# Patient Record
Sex: MALE | Race: WHITE | NOT HISPANIC OR LATINO | Employment: OTHER | URBAN - NONMETROPOLITAN AREA
[De-identification: names, ages, dates, MRNs, and addresses within clinical notes are randomized per-mention and may not be internally consistent; named-entity substitution may affect disease eponyms.]

---

## 2017-01-19 ENCOUNTER — TELEPHONE (OUTPATIENT)
Dept: CARDIOLOGY | Facility: CLINIC | Age: 52
End: 2017-01-19

## 2017-01-19 RX ORDER — INSULIN GLARGINE 100 [IU]/ML
45 INJECTION, SOLUTION SUBCUTANEOUS DAILY
Qty: 30 ML | Refills: 2 | Status: SHIPPED | OUTPATIENT
Start: 2017-01-19 | End: 2017-05-18 | Stop reason: ALTCHOICE

## 2017-01-19 NOTE — TELEPHONE ENCOUNTER
----- Message from Deisi Martínez sent at 1/19/2017 11:17 AM EST -----   INSULIN PIN NEEDLES LANTUS AND HUMALOG    [facility]

## 2017-02-10 RX ORDER — LISINOPRIL 20 MG/1
20 TABLET ORAL DAILY
Qty: 180 TABLET | Refills: 0 | Status: SHIPPED | OUTPATIENT
Start: 2017-02-10 | End: 2017-02-13

## 2017-02-10 RX ORDER — GABAPENTIN 400 MG/1
400 CAPSULE ORAL NIGHTLY
Qty: 30 CAPSULE | Refills: 2 | Status: SHIPPED | OUTPATIENT
Start: 2017-02-10 | End: 2017-05-08 | Stop reason: SDUPTHER

## 2017-02-13 RX ORDER — LISINOPRIL 20 MG/1
20 TABLET ORAL 2 TIMES DAILY
Qty: 180 TABLET | Refills: 0 | Status: SHIPPED | OUTPATIENT
Start: 2017-02-13 | End: 2017-03-07 | Stop reason: SDUPTHER

## 2017-02-13 NOTE — TELEPHONE ENCOUNTER
Pt should be taking lisinopril 20 bid. Refill was sent in for qd. rx corrected and resent to josette Perdomo

## 2017-03-02 ENCOUNTER — OFFICE VISIT (OUTPATIENT)
Dept: CARDIOLOGY | Facility: CLINIC | Age: 52
End: 2017-03-02

## 2017-03-02 VITALS
WEIGHT: 206 LBS | SYSTOLIC BLOOD PRESSURE: 138 MMHG | OXYGEN SATURATION: 97 % | HEART RATE: 69 BPM | DIASTOLIC BLOOD PRESSURE: 78 MMHG | HEIGHT: 68 IN | BODY MASS INDEX: 31.22 KG/M2

## 2017-03-02 DIAGNOSIS — Z79.4 TYPE 2 DIABETES MELLITUS WITH DIABETIC POLYNEUROPATHY, WITH LONG-TERM CURRENT USE OF INSULIN (HCC): ICD-10-CM

## 2017-03-02 DIAGNOSIS — N40.0 BENIGN NON-NODULAR PROSTATIC HYPERPLASIA WITHOUT LOWER URINARY TRACT SYMPTOMS: ICD-10-CM

## 2017-03-02 DIAGNOSIS — I10 ESSENTIAL HYPERTENSION: ICD-10-CM

## 2017-03-02 DIAGNOSIS — E11.42 TYPE 2 DIABETES MELLITUS WITH DIABETIC POLYNEUROPATHY, WITH LONG-TERM CURRENT USE OF INSULIN (HCC): ICD-10-CM

## 2017-03-02 DIAGNOSIS — E66.9 NON MORBID OBESITY, UNSPECIFIED OBESITY TYPE: ICD-10-CM

## 2017-03-02 DIAGNOSIS — Z72.0 TOBACCO USE: ICD-10-CM

## 2017-03-02 DIAGNOSIS — G62.9 PERIPHERAL POLYNEUROPATHY: ICD-10-CM

## 2017-03-02 DIAGNOSIS — E78.1 HYPERLIPIDEMIA TYPE IV: Primary | ICD-10-CM

## 2017-03-02 DIAGNOSIS — R53.82 CHRONIC FATIGUE: ICD-10-CM

## 2017-03-02 PROCEDURE — 99214 OFFICE O/P EST MOD 30 MIN: CPT | Performed by: INTERNAL MEDICINE

## 2017-03-02 NOTE — PROGRESS NOTES
subjective     Chief Complaint   Patient presents with   • Hypertension   • Hyperlipidemia   • Obesity     History of Present Illness  Diabetes Mellitus   Gui Li has type 2 diabetes mellitus. Taking oral hypoglycemic agents. Tolerating medications very well. he sugar is fluctuating. he is trying to diet and lose weight and exercise. There are no diabetic complications.  Patient brought in readings of blood sugar monitors. Blood sugar monitor readings are all normal.     HYPERTENSION  Gui Li has long-standing essential hypertension. he is taking medications regularly. There are no medication side effects. Blood pressure is running high. There has been no headache nausea chest pain. There has been no syncopal or presyncopal episode.  Patient is asymptomatic.     Hyperlipidemia  Gui Li has long-standing history of hyperlipidemia. Has been trying to lose weight and trying to follow diet and activity recommandations. Patient is tolerating medications very well. There has been no side effects. Latest lipid levels have been fluctuating. Cholesterol is normal but triglyceride is high.     Obesity  Patient has not been able to lose any weight.     He has peripheral neuropathy and states that Neurontin 100 mg twice a day is not helping. Although he takes 400 bedtime also.       Patient Active Problem List   Diagnosis   • Leg cramps*   • Peripheral neuritis*   • Obesity*   • Tobacco use*   • Hyperlipidemia type IV*   • Hypertension*   • Type 2 diabetes mellitus with diabetic polyneuropathy*   • Chronic fatigue       Social History   Substance Use Topics   • Smoking status: Current Every Day Smoker     Packs/day: 1.50     Years: 35.00     Types: Cigarettes   • Smokeless tobacco: Never Used   • Alcohol use No       No Known Allergies      Current Outpatient Prescriptions:   •  amLODIPine (NORVASC) 5 MG tablet, Take 1 tablet by mouth Daily., Disp: 90 tablet, Rfl: 3  •  diphenhydrAMINE-acetaminophen  "(TYLENOL PM)  MG tablet per tablet, Take 1 tablet by mouth at night as needed for sleep., Disp: , Rfl:   •  gabapentin (NEURONTIN) 300 MG capsule, Take 1 capsule by mouth 2 (Two) Times a Day., Disp: 60 capsule, Rfl: 3  •  gabapentin (NEURONTIN) 400 MG capsule, Take 1 capsule by mouth Every Night., Disp: 30 capsule, Rfl: 2  •  glucose blood (FREESTYLE LITE) test strip, 1 each by Other route 4 (Four) Times a Day., Disp: 100 each, Rfl: 3  •  hydrALAZINE (APRESOLINE) 10 MG tablet, Take 1 tablet by mouth 2 (two) times a day., Disp: 180 tablet, Rfl: 1  •  insulin glargine (LANTUS) 100 UNIT/ML injection, Inject 45 Units under the skin Daily., Disp: 30 mL, Rfl: 2  •  Insulin Lispro (HUMALOG KWIKPEN) 100 UNIT/ML solution pen-injector, Inject 10 Units under the skin 3 (Three) Times a Day After Meals., Disp: 3 mL, Rfl: 6  •  Insulin Pen Needle (EASY TOUCH PEN NEEDLES) 32G X 6 MM misc, Inject 1 Units under the skin 4 (Four) Times a Day., Disp: 100 each, Rfl: 3  •  Lancets (FREESTYLE) lancets, 1 each by Other route 4 (four) times a day., Disp: 100 each, Rfl: 3  •  lisinopril (PRINIVIL,ZESTRIL) 20 MG tablet, Take 1 tablet by mouth 2 (Two) Times a Day., Disp: 180 tablet, Rfl: 0  •  Q-DRYL 25 MG, TAKE 1 CAPSULE BY MOUTH EVERY NIGHT AT BEDTIME AS NEEDED FOR ITCHING, Disp: 30 capsule, Rfl: 0      The following portions of the patient's history were reviewed and updated as appropriate: allergies, current medications, past family history, past medical history, past social history, past surgical history and problem list.    Review of Systems   Constitution: Positive for weakness.   HENT: Negative.    Eyes: Negative.    Cardiovascular: Negative.    Respiratory: Negative.    Hematologic/Lymphatic: Negative.    Musculoskeletal: Positive for muscle cramps.   Gastrointestinal: Negative.           Objective:     Visit Vitals   • /78 (BP Location: Left arm, Patient Position: Sitting)   • Pulse 69   • Ht 68\" (172.7 cm)   • Wt 206 lb " (93.4 kg)   • SpO2 97%   • BMI 31.32 kg/m2     Physical Exam   Constitutional: He appears well-developed and well-nourished.   HENT:   Head: Normocephalic and atraumatic.   Mouth/Throat: Oropharynx is clear and moist.   Eyes: Conjunctivae and EOM are normal. Pupils are equal, round, and reactive to light. No scleral icterus.   Neck: Normal range of motion. Neck supple. No JVD present. No tracheal deviation present. No thyromegaly present.   Cardiovascular: Normal rate, regular rhythm, normal heart sounds and intact distal pulses.  Exam reveals no friction rub.    No murmur heard.  Pulmonary/Chest: Effort normal and breath sounds normal. No respiratory distress. He has no wheezes. He has no rales. He exhibits no tenderness.   Abdominal: Soft. Bowel sounds are normal. He exhibits no distension and no mass. There is no tenderness. There is no rebound and no guarding.   Musculoskeletal: Normal range of motion. He exhibits no edema, tenderness or deformity.   Lymphadenopathy:     He has no cervical adenopathy.   Neurological: He is alert. He has normal reflexes. No cranial nerve deficit. He exhibits normal muscle tone. Coordination normal.   Skin: Skin is warm and dry.   Psychiatric: He has a normal mood and affect. His behavior is normal. Judgment and thought content normal.         Lab Review  Lab Results   Component Value Date     12/06/2016    K 4.6 12/06/2016     (H) 12/06/2016    BUN 24 (H) 12/06/2016    CREATININE 1.24 12/06/2016    GLUCOSE 141 (H) 12/06/2016    CALCIUM 9.6 12/06/2016    ALT 29 12/06/2016    ALKPHOS 74 12/06/2016    LABIL2 1.5 12/06/2016     Lab Results   Component Value Date    CKTOTAL 254 (H) 12/06/2016     Lab Results   Component Value Date    WBC 9.88 12/06/2016    HGB 15.9 12/06/2016    HCT 47.8 12/06/2016     12/06/2016     No results found for: INR  No results found for: MG  Lab Results   Component Value Date    PSA 0.27 01/12/2015    TSH 2.812 12/06/2016     No results  found for: BNP  Lab Results   Component Value Date    CHLPL 128 01/29/2016     Lab Results   Component Value Date    CHOL 175 12/06/2016    TRIG 273 (H) 12/06/2016    HDL 32 (L) 12/06/2016    LDLCALC 88 12/06/2016    VLDL 54.6 12/06/2016    LDLHDL 2.76 12/06/2016         Procedures     I personally viewed and interpreted the patient's LAB data         Assessment:     1. Hyperlipidemia type IV*    2. Essential hypertension    3. Non morbid obesity, unspecified obesity type    4. Peripheral polyneuropathy    5. Tobacco use*    6. Type 2 diabetes mellitus with diabetic polyneuropathy, with long-term current use of insulin    7. Chronic fatigue          Plan:      Patient has type IV hyperlipidemia.  Cholesterol has been normal but triglyceride is elevated.  Healthy lifestyle was discussed.  He will continue current medications lab work was scheduled for next visit.    Physical discussed with the patient that his triglycerides are probably due to his sugar he will need to have a better sugar control dietary restrictions were again explained and more walking and exercise program discussed.    Blood pressure is very well controlled patient brought the daily blood pressure readings.  He checks it 3 times a day.  Blood pressure appears to be very well controlled.    Unfortunately patient is still using tobacco  Cessation of tobacco use was again stressed.  Refills were given  Lab work scheduled  Aggressive risk factor modification discussed.          No Follow-up on file.

## 2017-03-07 RX ORDER — LISINOPRIL 20 MG/1
20 TABLET ORAL 2 TIMES DAILY
Qty: 180 TABLET | Refills: 0 | Status: SHIPPED | OUTPATIENT
Start: 2017-03-07 | End: 2017-07-08 | Stop reason: SDUPTHER

## 2017-03-10 RX ORDER — HYDRALAZINE HYDROCHLORIDE 10 MG/1
TABLET, FILM COATED ORAL
Qty: 180 TABLET | Refills: 0 | Status: SHIPPED | OUTPATIENT
Start: 2017-03-10 | End: 2017-06-08 | Stop reason: SDUPTHER

## 2017-04-07 ENCOUNTER — TELEPHONE (OUTPATIENT)
Dept: CARDIOLOGY | Facility: CLINIC | Age: 52
End: 2017-04-07

## 2017-04-07 NOTE — TELEPHONE ENCOUNTER
----- Message from Hortensia Hernandez sent at 4/7/2017  9:58 AM EDT -----  Regarding: REFILL   CHYNA  GABAPENTIN 300 MG TID

## 2017-04-08 RX ORDER — GABAPENTIN 300 MG/1
CAPSULE ORAL
Qty: 60 CAPSULE | Refills: 0 | Status: CANCELLED | OUTPATIENT
Start: 2017-04-08

## 2017-04-10 RX ORDER — GABAPENTIN 300 MG/1
300 CAPSULE ORAL 2 TIMES DAILY
Qty: 60 CAPSULE | Refills: 2 | Status: SHIPPED | OUTPATIENT
Start: 2017-04-10 | End: 2017-07-08 | Stop reason: SDUPTHER

## 2017-05-08 ENCOUNTER — TELEPHONE (OUTPATIENT)
Dept: CARDIOLOGY | Facility: CLINIC | Age: 52
End: 2017-05-08

## 2017-05-08 RX ORDER — GABAPENTIN 400 MG/1
400 CAPSULE ORAL NIGHTLY
Qty: 30 CAPSULE | Refills: 2 | Status: SHIPPED | OUTPATIENT
Start: 2017-05-08 | End: 2017-06-08 | Stop reason: SDUPTHER

## 2017-05-08 RX ORDER — DIPHENHYDRAMINE HCL 25 MG
25 CAPSULE ORAL NIGHTLY PRN
Qty: 30 CAPSULE | Refills: 0 | Status: SHIPPED | OUTPATIENT
Start: 2017-05-08 | End: 2017-08-10 | Stop reason: ALTCHOICE

## 2017-05-17 ENCOUNTER — LAB (OUTPATIENT)
Dept: CARDIOLOGY | Facility: CLINIC | Age: 52
End: 2017-05-17

## 2017-05-17 ENCOUNTER — TELEPHONE (OUTPATIENT)
Dept: CARDIOLOGY | Facility: CLINIC | Age: 52
End: 2017-05-17

## 2017-05-17 DIAGNOSIS — E11.42 TYPE 2 DIABETES MELLITUS WITH DIABETIC POLYNEUROPATHY, WITH LONG-TERM CURRENT USE OF INSULIN (HCC): ICD-10-CM

## 2017-05-17 DIAGNOSIS — N40.0 BENIGN NON-NODULAR PROSTATIC HYPERPLASIA WITHOUT LOWER URINARY TRACT SYMPTOMS: ICD-10-CM

## 2017-05-17 DIAGNOSIS — Z79.4 TYPE 2 DIABETES MELLITUS WITH DIABETIC POLYNEUROPATHY, WITH LONG-TERM CURRENT USE OF INSULIN (HCC): ICD-10-CM

## 2017-05-17 DIAGNOSIS — E78.1 HYPERLIPIDEMIA TYPE IV: ICD-10-CM

## 2017-05-17 LAB
ALBUMIN SERPL-MCNC: 4 G/DL (ref 3.5–5)
ALBUMIN/GLOB SERPL: 1.4 G/DL (ref 1.5–2.5)
ALP SERPL-CCNC: 77 U/L (ref 40–129)
ALT SERPL W P-5'-P-CCNC: 32 U/L (ref 10–44)
ANION GAP SERPL CALCULATED.3IONS-SCNC: 3.5 MMOL/L (ref 3.6–11.2)
AST SERPL-CCNC: 30 U/L (ref 10–34)
BASOPHILS # BLD AUTO: 0.02 10*3/MM3 (ref 0–0.3)
BASOPHILS NFR BLD AUTO: 0.2 % (ref 0–2)
BILIRUB SERPL-MCNC: 0.5 MG/DL (ref 0.2–1.8)
BUN BLD-MCNC: 20 MG/DL (ref 7–21)
BUN/CREAT SERPL: 15.5 (ref 7–25)
CALCIUM SPEC-SCNC: 9 MG/DL (ref 7.7–10)
CHLORIDE SERPL-SCNC: 109 MMOL/L (ref 99–112)
CHOLEST SERPL-MCNC: 166 MG/DL (ref 0–200)
CK SERPL-CCNC: 669 U/L (ref 24–204)
CO2 SERPL-SCNC: 28.5 MMOL/L (ref 24.3–31.9)
CREAT BLD-MCNC: 1.29 MG/DL (ref 0.43–1.29)
DEPRECATED RDW RBC AUTO: 44.8 FL (ref 37–54)
EOSINOPHIL # BLD AUTO: 0.27 10*3/MM3 (ref 0–0.7)
EOSINOPHIL NFR BLD AUTO: 2.6 % (ref 0–5)
ERYTHROCYTE [DISTWIDTH] IN BLOOD BY AUTOMATED COUNT: 13.6 % (ref 11.5–14.5)
GFR SERPL CREATININE-BSD FRML MDRD: 58 ML/MIN/1.73
GLOBULIN UR ELPH-MCNC: 2.9 GM/DL
GLUCOSE BLD-MCNC: 141 MG/DL (ref 70–110)
HBA1C MFR BLD: 7.1 % (ref 4.5–5.7)
HCT VFR BLD AUTO: 47.3 % (ref 42–52)
HDLC SERPL-MCNC: 31 MG/DL (ref 60–100)
HGB BLD-MCNC: 15.3 G/DL (ref 14–18)
IMM GRANULOCYTES # BLD: 0.06 10*3/MM3 (ref 0–0.03)
IMM GRANULOCYTES NFR BLD: 0.6 % (ref 0–0.5)
LDLC SERPL CALC-MCNC: 86 MG/DL (ref 0–100)
LDLC/HDLC SERPL: 2.77 {RATIO}
LYMPHOCYTES # BLD AUTO: 2.35 10*3/MM3 (ref 1–3)
LYMPHOCYTES NFR BLD AUTO: 22.4 % (ref 21–51)
MCH RBC QN AUTO: 30.5 PG (ref 27–33)
MCHC RBC AUTO-ENTMCNC: 32.3 G/DL (ref 33–37)
MCV RBC AUTO: 94.2 FL (ref 80–94)
MONOCYTES # BLD AUTO: 0.89 10*3/MM3 (ref 0.1–0.9)
MONOCYTES NFR BLD AUTO: 8.5 % (ref 0–10)
NEUTROPHILS # BLD AUTO: 6.91 10*3/MM3 (ref 1.4–6.5)
NEUTROPHILS NFR BLD AUTO: 65.7 % (ref 30–70)
OSMOLALITY SERPL CALC.SUM OF ELEC: 286.2 MOSM/KG (ref 273–305)
PLATELET # BLD AUTO: 247 10*3/MM3 (ref 130–400)
PMV BLD AUTO: 12.1 FL (ref 6–10)
POTASSIUM BLD-SCNC: 4.7 MMOL/L (ref 3.5–5.3)
PROT SERPL-MCNC: 6.9 G/DL (ref 6–8)
PSA SERPL-MCNC: 0.34 NG/ML (ref 0–4)
RBC # BLD AUTO: 5.02 10*6/MM3 (ref 4.7–6.1)
SODIUM BLD-SCNC: 141 MMOL/L (ref 135–153)
TRIGL SERPL-MCNC: 245 MG/DL (ref 0–150)
VLDLC SERPL-MCNC: 49 MG/DL
WBC NRBC COR # BLD: 10.5 10*3/MM3 (ref 4.5–12.5)

## 2017-05-17 PROCEDURE — 80053 COMPREHEN METABOLIC PANEL: CPT | Performed by: INTERNAL MEDICINE

## 2017-05-17 PROCEDURE — 83036 HEMOGLOBIN GLYCOSYLATED A1C: CPT | Performed by: INTERNAL MEDICINE

## 2017-05-17 PROCEDURE — 80061 LIPID PANEL: CPT | Performed by: INTERNAL MEDICINE

## 2017-05-17 PROCEDURE — 82550 ASSAY OF CK (CPK): CPT | Performed by: INTERNAL MEDICINE

## 2017-05-17 PROCEDURE — 85025 COMPLETE CBC W/AUTO DIFF WBC: CPT | Performed by: INTERNAL MEDICINE

## 2017-05-17 PROCEDURE — 84153 ASSAY OF PSA TOTAL: CPT | Performed by: INTERNAL MEDICINE

## 2017-05-18 ENCOUNTER — OFFICE VISIT (OUTPATIENT)
Dept: CARDIOLOGY | Facility: CLINIC | Age: 52
End: 2017-05-18

## 2017-05-18 VITALS
DIASTOLIC BLOOD PRESSURE: 75 MMHG | SYSTOLIC BLOOD PRESSURE: 140 MMHG | HEIGHT: 68 IN | BODY MASS INDEX: 30.46 KG/M2 | HEART RATE: 92 BPM | OXYGEN SATURATION: 96 % | WEIGHT: 201 LBS

## 2017-05-18 DIAGNOSIS — E11.42 TYPE 2 DIABETES MELLITUS WITH DIABETIC POLYNEUROPATHY, WITH LONG-TERM CURRENT USE OF INSULIN (HCC): ICD-10-CM

## 2017-05-18 DIAGNOSIS — I10 ESSENTIAL HYPERTENSION: ICD-10-CM

## 2017-05-18 DIAGNOSIS — R74.8 ELEVATED CPK: ICD-10-CM

## 2017-05-18 DIAGNOSIS — E78.1 HYPERLIPIDEMIA TYPE IV: Primary | ICD-10-CM

## 2017-05-18 DIAGNOSIS — Z72.0 TOBACCO USE: ICD-10-CM

## 2017-05-18 DIAGNOSIS — R53.82 CHRONIC FATIGUE: ICD-10-CM

## 2017-05-18 DIAGNOSIS — Z79.4 TYPE 2 DIABETES MELLITUS WITH DIABETIC POLYNEUROPATHY, WITH LONG-TERM CURRENT USE OF INSULIN (HCC): ICD-10-CM

## 2017-05-18 DIAGNOSIS — E66.9 NON MORBID OBESITY, UNSPECIFIED OBESITY TYPE: ICD-10-CM

## 2017-05-18 DIAGNOSIS — R25.2 CRAMP OF BOTH LOWER EXTREMITIES: ICD-10-CM

## 2017-05-18 PROCEDURE — 99214 OFFICE O/P EST MOD 30 MIN: CPT | Performed by: INTERNAL MEDICINE

## 2017-05-18 RX ORDER — INSULIN GLARGINE 100 [IU]/ML
INJECTION, SOLUTION SUBCUTANEOUS NIGHTLY
COMMUNITY
End: 2017-06-07 | Stop reason: SDUPTHER

## 2017-06-06 RX ORDER — LANCETS
EACH MISCELLANEOUS
Qty: 100 EACH | Refills: 12 | Status: SHIPPED | OUTPATIENT
Start: 2017-06-06

## 2017-06-06 RX ORDER — BLOOD-GLUCOSE METER
1 EACH MISCELLANEOUS 3 TIMES DAILY
Qty: 1 EACH | Refills: 0 | Status: SHIPPED | OUTPATIENT
Start: 2017-06-06

## 2017-06-07 RX ORDER — INSULIN GLARGINE 100 [IU]/ML
INJECTION, SOLUTION SUBCUTANEOUS
Qty: 30 PEN | Refills: 0 | Status: SHIPPED | OUTPATIENT
Start: 2017-06-07 | End: 2017-08-07 | Stop reason: SDUPTHER

## 2017-06-08 RX ORDER — HYDRALAZINE HYDROCHLORIDE 10 MG/1
TABLET, FILM COATED ORAL
Qty: 180 TABLET | Refills: 2 | Status: SHIPPED | OUTPATIENT
Start: 2017-06-08 | End: 2017-11-09 | Stop reason: SDUPTHER

## 2017-06-08 RX ORDER — GABAPENTIN 400 MG/1
CAPSULE ORAL
Qty: 30 CAPSULE | Refills: 2 | OUTPATIENT
Start: 2017-06-08 | End: 2017-08-07 | Stop reason: SDUPTHER

## 2017-07-10 ENCOUNTER — TELEPHONE (OUTPATIENT)
Dept: CARDIOLOGY | Facility: CLINIC | Age: 52
End: 2017-07-10

## 2017-07-10 RX ORDER — LISINOPRIL 40 MG/1
40 TABLET ORAL DAILY
Qty: 30 TABLET | Refills: 2 | OUTPATIENT
Start: 2017-07-10 | End: 2017-10-05 | Stop reason: SDUPTHER

## 2017-07-10 RX ORDER — LISINOPRIL 20 MG/1
TABLET ORAL
Qty: 180 TABLET | Refills: 0 | Status: SHIPPED | OUTPATIENT
Start: 2017-07-10 | End: 2017-07-10

## 2017-07-10 RX ORDER — GABAPENTIN 300 MG/1
CAPSULE ORAL
Qty: 60 CAPSULE | Refills: 0 | Status: SHIPPED | OUTPATIENT
Start: 2017-07-10 | End: 2017-08-07 | Stop reason: SDUPTHER

## 2017-07-10 NOTE — TELEPHONE ENCOUNTER
----- Message from Fredrick Saldivar MD sent at 7/10/2017 12:11 PM EDT -----  Yes  ----- Message -----     From: Ira Larsen MA     Sent: 7/10/2017  11:16 AM       To: Fredrick Saldivar MD    Pharmacy called states that lisinopril 20 bid is not covered on his insurance but lisinopril 40 qd is.. Is it ok to take it qd or did you want it to be spaced out? If you wanted it bid i can try to get a PA on it.

## 2017-08-07 RX ORDER — GABAPENTIN 300 MG/1
300 CAPSULE ORAL 2 TIMES DAILY
Qty: 60 CAPSULE | Refills: 0 | OUTPATIENT
Start: 2017-08-07 | End: 2017-09-05 | Stop reason: SDUPTHER

## 2017-08-07 RX ORDER — INSULIN GLARGINE 100 [IU]/ML
INJECTION, SOLUTION SUBCUTANEOUS
Qty: 3 ML | Refills: 2 | Status: SHIPPED | OUTPATIENT
Start: 2017-08-07 | End: 2017-11-02 | Stop reason: SDUPTHER

## 2017-08-07 RX ORDER — GABAPENTIN 400 MG/1
400 CAPSULE ORAL DAILY
Qty: 30 CAPSULE | Refills: 0 | OUTPATIENT
Start: 2017-08-07 | End: 2017-09-05 | Stop reason: SDUPTHER

## 2017-08-10 ENCOUNTER — OFFICE VISIT (OUTPATIENT)
Dept: CARDIOLOGY | Facility: CLINIC | Age: 52
End: 2017-08-10

## 2017-08-10 VITALS
WEIGHT: 203.6 LBS | SYSTOLIC BLOOD PRESSURE: 138 MMHG | DIASTOLIC BLOOD PRESSURE: 64 MMHG | OXYGEN SATURATION: 98 % | BODY MASS INDEX: 30.86 KG/M2 | HEART RATE: 80 BPM | HEIGHT: 68 IN

## 2017-08-10 DIAGNOSIS — E11.42 TYPE 2 DIABETES MELLITUS WITH DIABETIC POLYNEUROPATHY, WITH LONG-TERM CURRENT USE OF INSULIN (HCC): ICD-10-CM

## 2017-08-10 DIAGNOSIS — R25.2 CRAMP OF BOTH LOWER EXTREMITIES: ICD-10-CM

## 2017-08-10 DIAGNOSIS — I10 ESSENTIAL HYPERTENSION: ICD-10-CM

## 2017-08-10 DIAGNOSIS — E66.9 NON MORBID OBESITY, UNSPECIFIED OBESITY TYPE: ICD-10-CM

## 2017-08-10 DIAGNOSIS — R53.82 CHRONIC FATIGUE: ICD-10-CM

## 2017-08-10 DIAGNOSIS — E78.1 HYPERLIPIDEMIA TYPE IV: Primary | ICD-10-CM

## 2017-08-10 DIAGNOSIS — G62.9 PERIPHERAL POLYNEUROPATHY: ICD-10-CM

## 2017-08-10 DIAGNOSIS — Z72.0 TOBACCO USE: ICD-10-CM

## 2017-08-10 DIAGNOSIS — Z79.4 TYPE 2 DIABETES MELLITUS WITH DIABETIC POLYNEUROPATHY, WITH LONG-TERM CURRENT USE OF INSULIN (HCC): ICD-10-CM

## 2017-08-10 PROCEDURE — 99214 OFFICE O/P EST MOD 30 MIN: CPT | Performed by: INTERNAL MEDICINE

## 2017-08-10 NOTE — PROGRESS NOTES
subjective     Chief Complaint   Patient presents with   • Hypertension   • Hyperlipidemia   • Diabetes   • Foot Pain     History of Present Illness  Diabetes Mellitus   Gui Li has type 2 diabetes mellitus.  Patient is taking insulin.  He is taking Basaglar quick pen and Humalog. He is trying to diet, lose weight and exercise. There are no diabetic complications.  Patient brought in readings of blood sugar monitors. Blood sugar monitor readings are all normal.      HYPERTENSION  Gui Li has long-standing essential hypertension. he is taking medications regularly. There are no medication side effects. Blood pressure is running high. There has been no headache nausea chest pain. There has been no syncopal or presyncopal episode. Patient is asymptomatic.      Hyperlipidemia  Gui Li has long-standing history of hyperlipidemia. Has been trying to lose weight and trying to follow diet and activity recommandations. Patient is tolerating medications very well. There has been no side effects. Latest lipid levels have been fluctuating. Cholesterol is normal but triglyceride is high.      Obesity  Patient has not been able to lose any weight.      Peripheral neuropathy  Patient complains of tingling and burning and numbness paresthesias in both lower extremities.  Patient is taking Neurontin but not happy with the results.  Patient has bounding peripheral pulses in both feet and posterior tibialis is 4+.  There is no evidence of ischemia.    Past Surgical History:   Procedure Laterality Date   • CARDIOVASCULAR STRESS TEST  04/2015   • ECHO - CONVERTED  10/2014   • TEETH EXTRACTION       Family History   Problem Relation Age of Onset   • Cancer Sister    • Diabetes Sister      Past Medical History:   Diagnosis Date   • Diabetes mellitus    • Hyperlipidemia    • Hypertension    • Leg cramps    • Obesity    • Peripheral neuritis    • Tobacco use      Patient Active Problem List   Diagnosis   • Leg  cramps*   • Peripheral neuritis*   • Obesity*   • Tobacco use*   • Hyperlipidemia type IV*   • Hypertension*   • Type 2 diabetes mellitus with diabetic polyneuropathy*   • Chronic fatigue   • Elevated CPK       Social History   Substance Use Topics   • Smoking status: Current Every Day Smoker     Packs/day: 1.50     Years: 35.00     Types: Cigarettes   • Smokeless tobacco: Never Used   • Alcohol use No       No Known Allergies    Current Outpatient Prescriptions on File Prior to Visit   Medication Sig   • amLODIPine (NORVASC) 5 MG tablet Take 1 tablet by mouth Daily.   • Blood Glucose Monitoring Suppl (ONE TOUCH ULTRA 2) W/DEVICE kit 1 kit 3 (Three) Times a Day. E11.9   • diphenhydrAMINE-acetaminophen (TYLENOL PM)  MG tablet per tablet Take 1 tablet by mouth at night as needed for sleep.   • gabapentin (NEURONTIN) 300 MG capsule Take 1 capsule by mouth 2 (Two) Times a Day.   • gabapentin (NEURONTIN) 400 MG capsule Take 1 capsule by mouth Daily.   • glucose blood test strip Pt needs to check blood sugar 3 times a day E11.9   • hydrALAZINE (APRESOLINE) 10 MG tablet TAKE 1 TABLET BY MOUTH TWICE DAILY   • Insulin Glargine (BASAGLAR KWIKPEN) 100 UNIT/ML injection pen INJECT 45 UNITS UNDER THE SKIN EVERY DAY   • Insulin Lispro (HUMALOG KWIKPEN) 100 UNIT/ML solution pen-injector Inject 10 Units under the skin 3 (Three) Times a Day After Meals.   • Insulin Pen Needle (INSUPEN PEN NEEDLES) 32G X 4 MM misc 1 Units 4 (Four) Times a Day.   • Lancets (ONETOUCH ULTRASOFT) lancets Pt needs to check blood sugar 3 times a day E11.9   • lisinopril (PRINIVIL,ZESTRIL) 40 MG tablet Take 1 tablet by mouth Daily.   • [DISCONTINUED] diphenhydrAMINE (Q-DRYL) 25 mg capsule Take 1 capsule by mouth At Night As Needed for Itching.     No current facility-administered medications on file prior to visit.          The following portions of the patient's history were reviewed and updated as appropriate: allergies, current medications, past  "family history, past medical history, past social history, past surgical history and problem list.    Review of Systems   Constitution: Negative.   HENT: Negative.  Negative for congestion and headaches.    Eyes: Negative.    Cardiovascular: Negative.  Negative for chest pain, cyanosis, dyspnea on exertion, irregular heartbeat, leg swelling, near-syncope, orthopnea, palpitations, paroxysmal nocturnal dyspnea and syncope.   Respiratory: Negative.  Negative for shortness of breath.    Endocrine: Negative.    Hematologic/Lymphatic: Negative.    Skin: Negative.    Musculoskeletal: Negative.         Bilateral lower extremity pain, numbness and tingling.   Gastrointestinal: Negative.    Genitourinary: Negative.    Neurological: Positive for numbness, paresthesias and sensory change.   Psychiatric/Behavioral: Negative.    Allergic/Immunologic: Negative.           Objective:     /64 (BP Location: Left arm, Patient Position: Standing)  Pulse 80  Ht 68\" (172.7 cm)  Wt 203 lb 9.6 oz (92.4 kg)  SpO2 98%  BMI 30.96 kg/m2  Physical Exam   Constitutional: He appears well-developed and well-nourished. No distress.   HENT:   Head: Normocephalic and atraumatic.   Mouth/Throat: Oropharynx is clear and moist. No oropharyngeal exudate.   Eyes: Conjunctivae and EOM are normal. Pupils are equal, round, and reactive to light. No scleral icterus.   Neck: Normal range of motion. Neck supple. No JVD present. No tracheal deviation present. No thyromegaly present.   Cardiovascular: Normal rate, regular rhythm, normal heart sounds and intact distal pulses.  PMI is not displaced.  Exam reveals no gallop, no friction rub and no decreased pulses.    No murmur heard.  Pulses:       Carotid pulses are 3+ on the right side, and 3+ on the left side.       Radial pulses are 3+ on the right side, and 3+ on the left side.        Posterior tibial pulses are 3+ on the right side, and 3+ on the left side.   Pulmonary/Chest: Effort normal and " breath sounds normal. No respiratory distress. He has no wheezes. He has no rales. He exhibits no tenderness.   Abdominal: Soft. Bowel sounds are normal. He exhibits no distension, no abdominal bruit and no mass. There is no splenomegaly or hepatomegaly. There is no tenderness. There is no rebound and no guarding.   Musculoskeletal: Normal range of motion. He exhibits no edema, tenderness or deformity.   Lymphadenopathy:     He has no cervical adenopathy.   Neurological: He is alert. He has normal reflexes. No cranial nerve deficit. He exhibits normal muscle tone. Coordination normal.   Skin: Skin is warm and dry. No rash noted. He is not diaphoretic. No erythema.   Psychiatric: He has a normal mood and affect. His behavior is normal. Judgment and thought content normal.         Lab Review  Lab Results   Component Value Date     05/17/2017    K 4.7 05/17/2017     05/17/2017    BUN 20 05/17/2017    CREATININE 1.29 05/17/2017    GLUCOSE 141 (H) 05/17/2017    CALCIUM 9.0 05/17/2017    ALT 32 05/17/2017    ALKPHOS 77 05/17/2017    LABIL2 1.4 (L) 05/17/2017     Lab Results   Component Value Date    CKTOTAL 669 (C) 05/17/2017     Lab Results   Component Value Date    WBC 10.50 05/17/2017    HGB 15.3 05/17/2017    HCT 47.3 05/17/2017     05/17/2017     No results found for: INR  No results found for: MG  Lab Results   Component Value Date    PSA 0.340 05/17/2017    TSH 2.812 12/06/2016     No results found for: BNP  Lab Results   Component Value Date    CHOL 166 05/17/2017    CHLPL 128 01/29/2016    TRIG 245 (H) 05/17/2017    HDL 31 (L) 05/17/2017    LDLCALC 86 05/17/2017    VLDL 49 05/17/2017    LDLHDL 2.77 05/17/2017         Procedures       I personally viewed and interpreted the patient's LAB data         Assessment:     1. Hyperlipidemia type IV*    2. Essential hypertension    3. Non morbid obesity, unspecified obesity type    4. Type 2 diabetes mellitus with diabetic polyneuropathy, with long-term  current use of insulin    5. Peripheral polyneuropathy    6. Cramp of both lower extremities    7. Chronic fatigue    8. Tobacco use*          Plan:      Sugar is not very well controlled.  Healthy lifestyle and weight loss was discussed with the patient.  Cholesterol is good but triglyceride is elevated.  Patient was advised to lose weight exercise and diet.  Patient was started on Crestor 5 mg daily  In the past he had elevated CPK will need to be watched closely.    Patient has peripheral neuropathy but no evidence of PAD at this time.  Unfortunately he continues to smoke also.  Cessation of smoking was encouraged.  Lab work in 3 months.        No Follow-up on file.

## 2017-08-11 RX ORDER — ROSUVASTATIN CALCIUM 5 MG/1
5 TABLET, COATED ORAL DAILY
Qty: 30 TABLET | Refills: 3 | Status: SHIPPED | OUTPATIENT
Start: 2017-08-11 | End: 2017-11-07 | Stop reason: SINTOL

## 2017-09-03 RX ORDER — GABAPENTIN 300 MG/1
CAPSULE ORAL
Qty: 60 CAPSULE | Refills: 0 | Status: CANCELLED | OUTPATIENT
Start: 2017-09-03

## 2017-09-03 RX ORDER — GABAPENTIN 400 MG/1
CAPSULE ORAL
Qty: 30 CAPSULE | Refills: 0 | Status: CANCELLED | OUTPATIENT
Start: 2017-09-03

## 2017-09-05 RX ORDER — GABAPENTIN 300 MG/1
300 CAPSULE ORAL 2 TIMES DAILY
Qty: 60 CAPSULE | Refills: 0 | OUTPATIENT
Start: 2017-09-05 | End: 2017-10-05 | Stop reason: SDUPTHER

## 2017-09-05 RX ORDER — GABAPENTIN 400 MG/1
400 CAPSULE ORAL DAILY
Qty: 30 CAPSULE | Refills: 0 | OUTPATIENT
Start: 2017-09-05 | End: 2017-10-05 | Stop reason: SDUPTHER

## 2017-10-04 RX ORDER — GABAPENTIN 400 MG/1
CAPSULE ORAL
Qty: 30 CAPSULE | Refills: 0 | Status: CANCELLED | OUTPATIENT
Start: 2017-10-04

## 2017-10-04 RX ORDER — GABAPENTIN 300 MG/1
CAPSULE ORAL
Qty: 60 CAPSULE | Refills: 0 | Status: CANCELLED | OUTPATIENT
Start: 2017-10-04

## 2017-10-05 RX ORDER — GABAPENTIN 400 MG/1
400 CAPSULE ORAL DAILY
Qty: 30 CAPSULE | Refills: 0 | OUTPATIENT
Start: 2017-10-05 | End: 2017-11-02 | Stop reason: SDUPTHER

## 2017-10-05 RX ORDER — GABAPENTIN 300 MG/1
300 CAPSULE ORAL 2 TIMES DAILY
Qty: 60 CAPSULE | Refills: 0 | OUTPATIENT
Start: 2017-10-05 | End: 2017-11-02 | Stop reason: SDUPTHER

## 2017-10-05 RX ORDER — LISINOPRIL 40 MG/1
40 TABLET ORAL DAILY
Qty: 30 TABLET | Refills: 2 | OUTPATIENT
Start: 2017-10-05 | End: 2017-11-07 | Stop reason: SDUPTHER

## 2017-11-03 RX ORDER — GABAPENTIN 300 MG/1
CAPSULE ORAL
Qty: 60 CAPSULE | Refills: 0 | Status: SHIPPED | OUTPATIENT
Start: 2017-11-03 | End: 2017-11-09 | Stop reason: SDUPTHER

## 2017-11-03 RX ORDER — GABAPENTIN 400 MG/1
CAPSULE ORAL
Qty: 30 CAPSULE | Refills: 0 | Status: SHIPPED | OUTPATIENT
Start: 2017-11-03 | End: 2017-11-09

## 2017-11-03 RX ORDER — INSULIN GLARGINE 100 [IU]/ML
INJECTION, SOLUTION SUBCUTANEOUS
Qty: 1 PEN | Refills: 0 | Status: SHIPPED | OUTPATIENT
Start: 2017-11-03 | End: 2017-12-12 | Stop reason: SDUPTHER

## 2017-11-06 ENCOUNTER — LAB (OUTPATIENT)
Dept: LAB | Facility: HOSPITAL | Age: 52
End: 2017-11-06

## 2017-11-06 DIAGNOSIS — E11.42 TYPE 2 DIABETES MELLITUS WITH DIABETIC POLYNEUROPATHY, WITH LONG-TERM CURRENT USE OF INSULIN (HCC): ICD-10-CM

## 2017-11-06 DIAGNOSIS — Z79.4 TYPE 2 DIABETES MELLITUS WITH DIABETIC POLYNEUROPATHY, WITH LONG-TERM CURRENT USE OF INSULIN (HCC): ICD-10-CM

## 2017-11-06 DIAGNOSIS — E78.1 HYPERLIPIDEMIA TYPE IV: ICD-10-CM

## 2017-11-06 LAB
ALBUMIN SERPL-MCNC: 4 G/DL (ref 3.5–5)
ALBUMIN UR-MCNC: 3725 MG/L
ALBUMIN/GLOB SERPL: 1.4 G/DL (ref 1.5–2.5)
ALP SERPL-CCNC: 75 U/L (ref 40–129)
ALT SERPL W P-5'-P-CCNC: 33 U/L (ref 10–44)
ANION GAP SERPL CALCULATED.3IONS-SCNC: 3.9 MMOL/L (ref 3.6–11.2)
AST SERPL-CCNC: 32 U/L (ref 10–34)
BASOPHILS # BLD AUTO: 0.03 10*3/MM3 (ref 0–0.3)
BASOPHILS NFR BLD AUTO: 0.3 % (ref 0–2)
BILIRUB SERPL-MCNC: 0.5 MG/DL (ref 0.2–1.8)
BUN BLD-MCNC: 22 MG/DL (ref 7–21)
BUN/CREAT SERPL: 14.7 (ref 7–25)
CALCIUM SPEC-SCNC: 9.2 MG/DL (ref 7.7–10)
CHLORIDE SERPL-SCNC: 111 MMOL/L (ref 99–112)
CHOLEST SERPL-MCNC: 111 MG/DL (ref 0–200)
CK SERPL-CCNC: 751 U/L (ref 24–204)
CO2 SERPL-SCNC: 26.1 MMOL/L (ref 24.3–31.9)
CREAT BLD-MCNC: 1.5 MG/DL (ref 0.43–1.29)
DEPRECATED RDW RBC AUTO: 46.4 FL (ref 37–54)
EOSINOPHIL # BLD AUTO: 0.21 10*3/MM3 (ref 0–0.7)
EOSINOPHIL NFR BLD AUTO: 1.8 % (ref 0–5)
ERYTHROCYTE [DISTWIDTH] IN BLOOD BY AUTOMATED COUNT: 13.6 % (ref 11.5–14.5)
GFR SERPL CREATININE-BSD FRML MDRD: 49 ML/MIN/1.73
GLOBULIN UR ELPH-MCNC: 2.8 GM/DL
GLUCOSE BLD-MCNC: 143 MG/DL (ref 70–110)
HBA1C MFR BLD: 6.6 % (ref 4.5–5.7)
HCT VFR BLD AUTO: 44.3 % (ref 42–52)
HDLC SERPL-MCNC: 31 MG/DL (ref 60–100)
HGB BLD-MCNC: 14.8 G/DL (ref 14–18)
IMM GRANULOCYTES # BLD: 0.05 10*3/MM3 (ref 0–0.03)
IMM GRANULOCYTES NFR BLD: 0.4 % (ref 0–0.5)
LDLC SERPL CALC-MCNC: 50 MG/DL (ref 0–100)
LDLC/HDLC SERPL: 1.61 {RATIO}
LYMPHOCYTES # BLD AUTO: 2.69 10*3/MM3 (ref 1–3)
LYMPHOCYTES NFR BLD AUTO: 23.5 % (ref 21–51)
MCH RBC QN AUTO: 31.2 PG (ref 27–33)
MCHC RBC AUTO-ENTMCNC: 33.4 G/DL (ref 33–37)
MCV RBC AUTO: 93.5 FL (ref 80–94)
MONOCYTES # BLD AUTO: 0.88 10*3/MM3 (ref 0.1–0.9)
MONOCYTES NFR BLD AUTO: 7.7 % (ref 0–10)
NEUTROPHILS # BLD AUTO: 7.6 10*3/MM3 (ref 1.4–6.5)
NEUTROPHILS NFR BLD AUTO: 66.3 % (ref 30–70)
OSMOLALITY SERPL CALC.SUM OF ELEC: 287.1 MOSM/KG (ref 273–305)
PLATELET # BLD AUTO: 217 10*3/MM3 (ref 130–400)
PMV BLD AUTO: 12.3 FL (ref 6–10)
POTASSIUM BLD-SCNC: 4.3 MMOL/L (ref 3.5–5.3)
PROT SERPL-MCNC: 6.8 G/DL (ref 6–8)
RBC # BLD AUTO: 4.74 10*6/MM3 (ref 4.7–6.1)
SODIUM BLD-SCNC: 141 MMOL/L (ref 135–153)
TRIGL SERPL-MCNC: 150 MG/DL (ref 0–150)
VLDLC SERPL-MCNC: 30 MG/DL
WBC NRBC COR # BLD: 11.46 10*3/MM3 (ref 4.5–12.5)

## 2017-11-06 PROCEDURE — 85025 COMPLETE CBC W/AUTO DIFF WBC: CPT | Performed by: INTERNAL MEDICINE

## 2017-11-06 PROCEDURE — 80053 COMPREHEN METABOLIC PANEL: CPT | Performed by: INTERNAL MEDICINE

## 2017-11-06 PROCEDURE — 82550 ASSAY OF CK (CPK): CPT | Performed by: INTERNAL MEDICINE

## 2017-11-06 PROCEDURE — 83036 HEMOGLOBIN GLYCOSYLATED A1C: CPT | Performed by: INTERNAL MEDICINE

## 2017-11-06 PROCEDURE — 82043 UR ALBUMIN QUANTITATIVE: CPT | Performed by: INTERNAL MEDICINE

## 2017-11-06 PROCEDURE — 80061 LIPID PANEL: CPT | Performed by: INTERNAL MEDICINE

## 2017-11-07 ENCOUNTER — TELEPHONE (OUTPATIENT)
Dept: CARDIOLOGY | Facility: CLINIC | Age: 52
End: 2017-11-07

## 2017-11-07 DIAGNOSIS — R74.8 ELEVATED CPK: Primary | ICD-10-CM

## 2017-11-07 RX ORDER — LISINOPRIL 40 MG/1
TABLET ORAL
Qty: 90 TABLET | Refills: 0 | Status: SHIPPED | OUTPATIENT
Start: 2017-11-07 | End: 2017-11-09

## 2017-11-09 ENCOUNTER — OFFICE VISIT (OUTPATIENT)
Dept: CARDIOLOGY | Facility: CLINIC | Age: 52
End: 2017-11-09

## 2017-11-09 VITALS
BODY MASS INDEX: 30.52 KG/M2 | SYSTOLIC BLOOD PRESSURE: 148 MMHG | HEIGHT: 68 IN | WEIGHT: 201.4 LBS | DIASTOLIC BLOOD PRESSURE: 64 MMHG

## 2017-11-09 DIAGNOSIS — E11.42 TYPE 2 DIABETES MELLITUS WITH DIABETIC POLYNEUROPATHY, WITH LONG-TERM CURRENT USE OF INSULIN (HCC): Primary | ICD-10-CM

## 2017-11-09 DIAGNOSIS — R74.8 ELEVATED CPK: ICD-10-CM

## 2017-11-09 DIAGNOSIS — Z79.4 TYPE 2 DIABETES MELLITUS WITH DIABETIC POLYNEUROPATHY, WITH LONG-TERM CURRENT USE OF INSULIN (HCC): Primary | ICD-10-CM

## 2017-11-09 DIAGNOSIS — E78.1 HYPERLIPIDEMIA TYPE IV: ICD-10-CM

## 2017-11-09 DIAGNOSIS — Z72.0 TOBACCO USE: ICD-10-CM

## 2017-11-09 DIAGNOSIS — G62.9 PERIPHERAL POLYNEUROPATHY: ICD-10-CM

## 2017-11-09 DIAGNOSIS — I10 ESSENTIAL HYPERTENSION: ICD-10-CM

## 2017-11-09 PROCEDURE — 99214 OFFICE O/P EST MOD 30 MIN: CPT | Performed by: INTERNAL MEDICINE

## 2017-11-09 RX ORDER — GABAPENTIN 300 MG/1
300 CAPSULE ORAL 4 TIMES DAILY
Qty: 120 CAPSULE | Refills: 2 | Status: SHIPPED | OUTPATIENT
Start: 2017-11-09 | End: 2018-02-07 | Stop reason: SDUPTHER

## 2017-11-09 RX ORDER — LISINOPRIL 20 MG/1
20 TABLET ORAL DAILY
Qty: 90 TABLET | Refills: 3
Start: 2017-11-09 | End: 2018-01-31 | Stop reason: SDUPTHER

## 2017-11-09 RX ORDER — HYDRALAZINE HYDROCHLORIDE 10 MG/1
20 TABLET, FILM COATED ORAL 3 TIMES DAILY
Qty: 180 TABLET | Refills: 2 | Status: SHIPPED | OUTPATIENT
Start: 2017-11-09 | End: 2018-02-09 | Stop reason: SDUPTHER

## 2017-11-09 NOTE — PROGRESS NOTES
subjective     Chief Complaint   Patient presents with   • Hypertension   • Hyperlipidemia   • Diabetes     History of Present Illness  Patient is here for follow-up of multiple chronic medical conditions.  Patient is diabetic.  He is taking basaglar and Humalog.  Blood pressure is elevated to 143.  Patient insists that his blood pressure at home is normal.  He has not lost any weight.    Patient also has a hypertension he has been taking lisinopril 40 mg daily, amlodipine 5 mg daily and hydralazine 10 mg 3 times a day.  Blood pressure seems to be fairly well controlled.    Patient has renal failure.  Creatinine has been elevated.  Patient is not taking any arthritis medication or diuretic therapy.  It is probably diabetic nephropathy.    Patient also has hyperlipidemia.  He was taking Crestor 5 mg daily but was discontinued because of CPK elevation.  CPK was significantly elevated at 751.    Patient also has peripheral neuritis.  Probably due to diabetic neuropathy.  He is taking Neurontin but he still having a lot of problems and wants to increase the medication.    Past Surgical History:   Procedure Laterality Date   • CARDIOVASCULAR STRESS TEST  04/2015   • ECHO - CONVERTED  10/2014   • TEETH EXTRACTION       Family History   Problem Relation Age of Onset   • Cancer Sister    • Diabetes Sister      Past Medical History:   Diagnosis Date   • Diabetes mellitus    • Hyperlipidemia    • Hypertension    • Leg cramps    • Obesity    • Peripheral neuritis    • Tobacco use      Patient Active Problem List   Diagnosis   • Leg cramps*   • Peripheral neuritis*   • Obesity*   • Tobacco use*   • Hyperlipidemia type IV*   • Hypertension*   • Type 2 diabetes mellitus with diabetic polyneuropathy*   • Chronic fatigue   • Elevated CPK       Social History   Substance Use Topics   • Smoking status: Current Every Day Smoker     Packs/day: 1.50     Years: 35.00     Types: Cigarettes   • Smokeless tobacco: Never Used   • Alcohol use  "No       No Known Allergies    Current Outpatient Prescriptions on File Prior to Visit   Medication Sig   • amLODIPine (NORVASC) 5 MG tablet Take 1 tablet by mouth Daily.   • Blood Glucose Monitoring Suppl (ONE TOUCH ULTRA 2) W/DEVICE kit 1 kit 3 (Three) Times a Day. E11.9   • glucose blood test strip Pt needs to check blood sugar 3 times a day E11.9   • Insulin Glargine (BASAGLAR KWIKPEN) 100 UNIT/ML injection pen INJECT 45 UNITS UNDER THE SKIN EVERY DAY   • Insulin Lispro (HUMALOG KWIKPEN) 100 UNIT/ML solution pen-injector Inject 10 Units under the skin 3 (Three) Times a Day After Meals.   • Insulin Pen Needle (INSUPEN PEN NEEDLES) 32G X 4 MM misc 1 Units 4 (Four) Times a Day.   • Lancets (ONETOUCH ULTRASOFT) lancets Pt needs to check blood sugar 3 times a day E11.9     No current facility-administered medications on file prior to visit.          The following portions of the patient's history were reviewed and updated as appropriate: allergies, current medications, past family history, past medical history, past social history, past surgical history and problem list.    Review of Systems   Constitution: Negative.   HENT: Negative.  Negative for congestion.    Eyes: Negative.    Cardiovascular: Negative.  Negative for chest pain, cyanosis, dyspnea on exertion, irregular heartbeat, leg swelling, near-syncope, orthopnea, palpitations, paroxysmal nocturnal dyspnea and syncope.   Respiratory: Negative.  Negative for shortness of breath.    Hematologic/Lymphatic: Negative.    Musculoskeletal: Positive for arthritis and myalgias.   Gastrointestinal: Negative.    Neurological: Positive for numbness and paresthesias. Negative for headaches.          Objective:     /64 (BP Location: Left arm, Patient Position: Sitting)  Ht 68\" (172.7 cm)  Wt 201 lb 6.4 oz (91.4 kg)  BMI 30.62 kg/m2  Physical Exam   Constitutional: He appears well-developed and well-nourished. No distress.   HENT:   Head: Normocephalic and " atraumatic.   Mouth/Throat: Oropharynx is clear and moist. No oropharyngeal exudate.   Eyes: Conjunctivae and EOM are normal. Pupils are equal, round, and reactive to light. No scleral icterus.   Neck: Normal range of motion. Neck supple. No JVD present. No tracheal deviation present. No thyromegaly present.   Cardiovascular: Normal rate, regular rhythm, normal heart sounds and intact distal pulses.  PMI is not displaced.  Exam reveals no gallop, no friction rub and no decreased pulses.    No murmur heard.  Pulses:       Carotid pulses are 3+ on the right side, and 3+ on the left side.       Radial pulses are 3+ on the right side, and 3+ on the left side.   Pulmonary/Chest: Effort normal and breath sounds normal. No respiratory distress. He has no wheezes. He has no rales. He exhibits no tenderness.   Abdominal: Soft. Bowel sounds are normal. He exhibits no distension, no abdominal bruit and no mass. There is no splenomegaly or hepatomegaly. There is no tenderness. There is no rebound and no guarding.   Musculoskeletal: Normal range of motion. He exhibits no edema, tenderness or deformity.   Lymphadenopathy:     He has no cervical adenopathy.   Neurological: He is alert. He has normal reflexes. No cranial nerve deficit. He exhibits normal muscle tone. Coordination normal.   Skin: Skin is warm and dry. No rash noted. He is not diaphoretic. No erythema.   Psychiatric: He has a normal mood and affect. His behavior is normal. Judgment and thought content normal.         Lab Review  Lab Results   Component Value Date     11/06/2017    K 4.3 11/06/2017     11/06/2017    BUN 22 (H) 11/06/2017    CREATININE 1.50 (H) 11/06/2017    GLUCOSE 143 (H) 11/06/2017    CALCIUM 9.2 11/06/2017    ALT 33 11/06/2017    ALKPHOS 75 11/06/2017    LABIL2 1.4 (L) 11/06/2017     Lab Results   Component Value Date    CKTOTAL 751 (C) 11/06/2017     Lab Results   Component Value Date    WBC 11.46 11/06/2017    HGB 14.8 11/06/2017     HCT 44.3 11/06/2017     11/06/2017     No results found for: INR  No results found for: MG  Lab Results   Component Value Date    PSA 0.340 05/17/2017    TSH 2.812 12/06/2016     No results found for: BNP  Lab Results   Component Value Date    CHLPL 128 01/29/2016    CHOL 111 11/06/2017    TRIG 150 11/06/2017    HDL 31 (L) 11/06/2017    LDLCALC 50 11/06/2017    VLDL 30 11/06/2017    LDLHDL 1.61 11/06/2017         Procedures       I personally viewed and interpreted the patient's LAB data         Assessment:     1. Type 2 diabetes mellitus with diabetic polyneuropathy, with long-term current use of insulin    2. Hyperlipidemia type IV*    3. Essential hypertension    4. Peripheral polyneuropathy    5. Elevated CPK    6. Tobacco use*          Plan:      Labs reviewed and discussed with the patient  Lipid panel is normal cholesterol actually is 111 with LDL 50.  Unfortunately CPK is significantly elevated.  Patient was advised to DC Crestor at this time.  CPK will be repeated in one month.    Diabetes is not very well controlled.  Weight loss and dietary recommendations were again discussed with healthy lifestyle.    Blood pressure is not very well controlled however patient has renal failure Will decrease lisinopril to 20 mg daily and will increase hydralazine 23 times a day.  He was advised to check his blood pressure closely.    Gabapentin dose was increased to 1200 mg daily patient will take 600 mg at night and 300 and a morning and 300 at noontime.    Health maintenance was discussed.  Patient refuses flu shot    Cessation of tobacco use was also urged    Return in about 3 months (around 2/9/2018).

## 2017-11-28 RX ORDER — AMLODIPINE BESYLATE 5 MG/1
TABLET ORAL
Qty: 90 TABLET | Refills: 0 | Status: SHIPPED | OUTPATIENT
Start: 2017-11-28 | End: 2018-02-22 | Stop reason: SDUPTHER

## 2017-11-29 RX ORDER — ROSUVASTATIN CALCIUM 5 MG/1
TABLET, COATED ORAL
Qty: 30 TABLET | Refills: 0 | OUTPATIENT
Start: 2017-11-29

## 2017-12-12 RX ORDER — INSULIN GLARGINE 100 [IU]/ML
INJECTION, SOLUTION SUBCUTANEOUS
Qty: 3 PEN | Refills: 2 | Status: SHIPPED | OUTPATIENT
Start: 2017-12-12 | End: 2018-03-06 | Stop reason: SDUPTHER

## 2017-12-14 ENCOUNTER — TELEPHONE (OUTPATIENT)
Dept: CARDIOLOGY | Facility: CLINIC | Age: 52
End: 2017-12-14

## 2017-12-14 ENCOUNTER — LAB (OUTPATIENT)
Dept: LAB | Facility: HOSPITAL | Age: 52
End: 2017-12-14

## 2017-12-14 DIAGNOSIS — R74.8 ELEVATED CPK: Primary | ICD-10-CM

## 2017-12-14 DIAGNOSIS — R74.8 ELEVATED CPK: ICD-10-CM

## 2017-12-14 LAB
CK SERPL-CCNC: 571 U/L (ref 24–204)
TROPONIN I SERPL-MCNC: 0.01 NG/ML

## 2017-12-14 PROCEDURE — 84484 ASSAY OF TROPONIN QUANT: CPT

## 2017-12-14 PROCEDURE — 82550 ASSAY OF CK (CPK): CPT

## 2017-12-14 NOTE — TELEPHONE ENCOUNTER
MD Ronna Valadez MA                   Lab work is some better but it is still abnormal.  Drink more fluids.  Make sure you are not taking any cholesterol medication.   Will check CPK again in one month

## 2017-12-27 RX ORDER — INSULIN LISPRO 100 [IU]/ML
INJECTION, SOLUTION INTRAVENOUS; SUBCUTANEOUS
Qty: 90 ML | Refills: 0 | Status: SHIPPED | OUTPATIENT
Start: 2017-12-27 | End: 2018-07-10 | Stop reason: SDUPTHER

## 2018-01-12 ENCOUNTER — LAB (OUTPATIENT)
Dept: LAB | Facility: HOSPITAL | Age: 53
End: 2018-01-12

## 2018-01-12 DIAGNOSIS — R74.8 ELEVATED CPK: ICD-10-CM

## 2018-01-12 LAB — CK SERPL-CCNC: 343 U/L (ref 24–204)

## 2018-01-12 PROCEDURE — 82550 ASSAY OF CK (CPK): CPT

## 2018-01-31 RX ORDER — LISINOPRIL 20 MG/1
20 TABLET ORAL DAILY
Qty: 90 TABLET | Refills: 0 | Status: SHIPPED | OUTPATIENT
Start: 2018-01-31 | End: 2018-04-25 | Stop reason: SDUPTHER

## 2018-01-31 RX ORDER — LISINOPRIL 40 MG/1
TABLET ORAL
Qty: 90 TABLET | Refills: 0 | OUTPATIENT
Start: 2018-01-31

## 2018-02-07 RX ORDER — GABAPENTIN 300 MG/1
CAPSULE ORAL
Qty: 112 CAPSULE | Refills: 0 | OUTPATIENT
Start: 2018-02-07 | End: 2018-03-05 | Stop reason: SDUPTHER

## 2018-02-09 RX ORDER — HYDRALAZINE HYDROCHLORIDE 10 MG/1
TABLET, FILM COATED ORAL
Qty: 180 TABLET | Refills: 0 | Status: SHIPPED | OUTPATIENT
Start: 2018-02-09 | End: 2018-02-21

## 2018-02-21 ENCOUNTER — OFFICE VISIT (OUTPATIENT)
Dept: CARDIOLOGY | Facility: CLINIC | Age: 53
End: 2018-02-21

## 2018-02-21 VITALS
HEIGHT: 68 IN | DIASTOLIC BLOOD PRESSURE: 80 MMHG | OXYGEN SATURATION: 97 % | BODY MASS INDEX: 30.46 KG/M2 | HEART RATE: 88 BPM | SYSTOLIC BLOOD PRESSURE: 146 MMHG | WEIGHT: 201 LBS

## 2018-02-21 DIAGNOSIS — I10 ESSENTIAL HYPERTENSION: ICD-10-CM

## 2018-02-21 DIAGNOSIS — E11.22 TYPE 2 DIABETES MELLITUS WITH STAGE 3 CHRONIC KIDNEY DISEASE, WITH LONG-TERM CURRENT USE OF INSULIN (HCC): Primary | ICD-10-CM

## 2018-02-21 DIAGNOSIS — E78.1 HYPERLIPIDEMIA TYPE IV: Primary | ICD-10-CM

## 2018-02-21 DIAGNOSIS — N18.30 TYPE 2 DIABETES MELLITUS WITH STAGE 3 CHRONIC KIDNEY DISEASE, WITH LONG-TERM CURRENT USE OF INSULIN (HCC): ICD-10-CM

## 2018-02-21 DIAGNOSIS — R74.8 ELEVATED CPK: ICD-10-CM

## 2018-02-21 DIAGNOSIS — Z72.0 TOBACCO USE: ICD-10-CM

## 2018-02-21 DIAGNOSIS — N18.30 TYPE 2 DIABETES MELLITUS WITH STAGE 3 CHRONIC KIDNEY DISEASE, WITH LONG-TERM CURRENT USE OF INSULIN (HCC): Primary | ICD-10-CM

## 2018-02-21 DIAGNOSIS — Z79.4 TYPE 2 DIABETES MELLITUS WITH STAGE 3 CHRONIC KIDNEY DISEASE, WITH LONG-TERM CURRENT USE OF INSULIN (HCC): Primary | ICD-10-CM

## 2018-02-21 DIAGNOSIS — G62.9 PERIPHERAL POLYNEUROPATHY: ICD-10-CM

## 2018-02-21 DIAGNOSIS — Z12.5 SCREENING FOR PROSTATE CANCER: ICD-10-CM

## 2018-02-21 DIAGNOSIS — Z79.4 TYPE 2 DIABETES MELLITUS WITH STAGE 3 CHRONIC KIDNEY DISEASE, WITH LONG-TERM CURRENT USE OF INSULIN (HCC): ICD-10-CM

## 2018-02-21 DIAGNOSIS — E66.9 CLASS 1 OBESITY WITHOUT SERIOUS COMORBIDITY WITH BODY MASS INDEX (BMI) OF 30.0 TO 30.9 IN ADULT, UNSPECIFIED OBESITY TYPE: ICD-10-CM

## 2018-02-21 DIAGNOSIS — E11.22 TYPE 2 DIABETES MELLITUS WITH STAGE 3 CHRONIC KIDNEY DISEASE, WITH LONG-TERM CURRENT USE OF INSULIN (HCC): ICD-10-CM

## 2018-02-21 PROBLEM — N18.9 CHRONIC KIDNEY DISEASE: Status: ACTIVE | Noted: 2018-02-21

## 2018-02-21 PROCEDURE — 99214 OFFICE O/P EST MOD 30 MIN: CPT | Performed by: INTERNAL MEDICINE

## 2018-02-21 RX ORDER — HYDRALAZINE HYDROCHLORIDE 25 MG/1
25 TABLET, FILM COATED ORAL 3 TIMES DAILY
Qty: 90 TABLET | Refills: 4 | Status: SHIPPED | OUTPATIENT
Start: 2018-02-21 | End: 2018-07-10 | Stop reason: SDUPTHER

## 2018-02-21 NOTE — PROGRESS NOTES
subjective     Chief Complaint   Patient presents with   • Follow-up   • Hypertension   • Hyperlipidemia   • Diabetes     History of Present Illness    Patient is 52 years old white male who has diabetes mellitus insulin-dependent.  Patient brought a list of his blood sugar readings 3 times a day blood sugar mostly running around 110-116.  There are 2 readings in last 3 months which are high around 180.  Most of the readings are good.    Patient complains of burning in both feet  There is no claudication.  He is taking Neurontin 300 mg 3 times a day but wants to take more.  Peripheral pulses are good.    Patient also has renal failure.  Creatinine is elevated.  She denies any pain dysuria or hematuria.  He is not taking any metformin and is drinking a lot of fluids.    He also has hypertension.  He is taking Norvasc, lisinopril and hydralazine.  Blood pressure is running around 145 systolic.  Today blood pressures in the office is also elevated.    Patient also continues to use tobacco.  Patient is diabetic but is not taking any statin therapy because of elevated CPK and intolerance to statin therapy.    Past Surgical History:   Procedure Laterality Date   • CARDIOVASCULAR STRESS TEST  04/2015   • ECHO - CONVERTED  10/2014   • TEETH EXTRACTION       Family History   Problem Relation Age of Onset   • Cancer Sister    • Diabetes Sister      Past Medical History:   Diagnosis Date   • Diabetes mellitus    • Hyperlipidemia    • Hypertension    • Leg cramps    • Obesity    • Peripheral neuritis    • Tobacco use      Patient Active Problem List   Diagnosis   • Leg cramps*   • Peripheral neuritis*   • Obesity*   • Tobacco use*   • Hyperlipidemia type IV*   • Hypertension*   • Type 2 diabetes mellitus with chronic kidney disease, with long-term current use of insulin   • Chronic fatigue   • Elevated CPK   • Chronic kidney disease       Social History   Substance Use Topics   • Smoking status: Current Every Day Smoker      Packs/day: 1.50     Years: 35.00     Types: Cigarettes   • Smokeless tobacco: Never Used   • Alcohol use No       No Known Allergies    Current Outpatient Prescriptions on File Prior to Visit   Medication Sig   • amLODIPine (NORVASC) 5 MG tablet TAKE 1 TABLET BY MOUTH DAILY   • Blood Glucose Monitoring Suppl (ONE TOUCH ULTRA 2) W/DEVICE kit 1 kit 3 (Three) Times a Day. E11.9   • gabapentin (NEURONTIN) 300 MG capsule TAKE 1 CAPSULE BY MOUTH FOUR TIMES DAILY   • glucose blood test strip Pt needs to check blood sugar 3 times a day E11.9   • HUMALOG KWIKPEN 100 UNIT/ML solution pen-injector INJECT 10 UNITS SUBCUTANEOUS THREE TIMES DAILY BEFORE MEALS   • hydrALAZINE (APRESOLINE) 10 MG tablet TAKE 2 TABLETS BY MOUTH THREE TIMES DAILY   • Insulin Glargine (BASAGLAR KWIKPEN) 100 UNIT/ML injection pen INJECT 45 UNITS UNDER THE SKIN EVERY DAY   • Insulin Lispro (HUMALOG KWIKPEN) 100 UNIT/ML solution pen-injector Inject 10 Units under the skin 3 (Three) Times a Day After Meals.   • Insulin Pen Needle (INSUPEN PEN NEEDLES) 32G X 4 MM misc 1 Units 4 (Four) Times a Day.   • Lancets (ONETOUCH ULTRASOFT) lancets Pt needs to check blood sugar 3 times a day E11.9   • lisinopril (PRINIVIL,ZESTRIL) 20 MG tablet Take 1 tablet by mouth Daily.     No current facility-administered medications on file prior to visit.          The following portions of the patient's history were reviewed and updated as appropriate: allergies, current medications, past family history, past medical history, past social history, past surgical history and problem list.    Review of Systems   Constitution: Negative.   HENT: Negative.  Negative for congestion.    Eyes: Negative.    Cardiovascular: Negative.  Negative for chest pain, cyanosis, dyspnea on exertion, irregular heartbeat, leg swelling, near-syncope, orthopnea, palpitations, paroxysmal nocturnal dyspnea and syncope.   Respiratory: Negative.  Negative for shortness of breath.    Hematologic/Lymphatic:  "Negative.    Musculoskeletal: Negative.    Gastrointestinal: Negative.    Neurological: Positive for paresthesias. Negative for headaches.        Feet burning and hurting          Objective:     /80 (BP Location: Left arm, Patient Position: Sitting, Cuff Size: Adult)  Pulse 88  Ht 172.7 cm (67.99\")  Wt 91.2 kg (201 lb)  SpO2 97%  BMI 30.57 kg/m2  Physical Exam   Constitutional: He appears well-developed and well-nourished. No distress.   HENT:   Head: Normocephalic and atraumatic.   Mouth/Throat: Oropharynx is clear and moist. No oropharyngeal exudate.   Eyes: Conjunctivae and EOM are normal. Pupils are equal, round, and reactive to light. No scleral icterus.   Neck: Normal range of motion. Neck supple. No JVD present. No tracheal deviation present. No thyromegaly present.   Cardiovascular: Normal rate, regular rhythm, normal heart sounds and intact distal pulses.  PMI is not displaced.  Exam reveals no gallop, no friction rub and no decreased pulses.    No murmur heard.  Pulses:       Carotid pulses are 3+ on the right side, and 3+ on the left side.       Radial pulses are 3+ on the right side, and 3+ on the left side.   Pulmonary/Chest: Effort normal and breath sounds normal. No respiratory distress. He has no wheezes. He has no rales. He exhibits no tenderness.   Abdominal: Soft. Bowel sounds are normal. He exhibits no distension, no abdominal bruit and no mass. There is no splenomegaly or hepatomegaly. There is no tenderness. There is no rebound and no guarding.   Musculoskeletal: Normal range of motion. He exhibits no edema, tenderness or deformity.   Lymphadenopathy:     He has no cervical adenopathy.   Neurological: He is alert. He has normal reflexes. No cranial nerve deficit. He exhibits normal muscle tone. Coordination normal.   Skin: Skin is warm and dry. No rash noted. He is not diaphoretic. No erythema.   Psychiatric: He has a normal mood and affect. His behavior is normal. Judgment and " thought content normal.         Lab Review  Lab Results   Component Value Date     11/06/2017    K 4.3 11/06/2017     11/06/2017    BUN 22 (H) 11/06/2017    CREATININE 1.50 (H) 11/06/2017    GLUCOSE 143 (H) 11/06/2017    CALCIUM 9.2 11/06/2017    ALT 33 11/06/2017    ALKPHOS 75 11/06/2017    LABIL2 1.4 (L) 11/06/2017     Lab Results   Component Value Date    CKTOTAL 343 (H) 01/12/2018     Lab Results   Component Value Date    WBC 11.46 11/06/2017    HGB 14.8 11/06/2017    HCT 44.3 11/06/2017     11/06/2017     No results found for: INR  No results found for: MG  Lab Results   Component Value Date    PSA 0.340 05/17/2017    TSH 2.812 12/06/2016     No results found for: BNP  Lab Results   Component Value Date    CHLPL 128 01/29/2016    CHOL 111 11/06/2017    TRIG 150 11/06/2017    HDL 31 (L) 11/06/2017    VLDL 30 11/06/2017    LDLHDL 1.61 11/06/2017         Procedures       I personally viewed and interpreted the patient's LAB data         Assessment:     1. Hyperlipidemia type IV*    2. Essential hypertension    3. Tobacco use*    4. Class 1 obesity without serious comorbidity with body mass index (BMI) of 30.0 to 30.9 in adult, unspecified obesity type    5. Type 2 diabetes mellitus with stage 3 chronic kidney disease, with long-term current use of insulin    6. Peripheral polyneuropathy    7. Elevated CPK          Plan:      Blood pressure is elevated.  Lisinopril dose was not increased because of renal failure  Diuretic therapy was also not started because of renal failure  Patient will continue amlodipine  Stelazine dose was increased to 25 mg.  1 tablet 3 times a day.    Patient has no evidence of PAD but has peripheral neuritis.  He will continue Neurontin 3 times a day.  Consultation with Dr. salmon for neuropathy was made.  Podiatry consult was also made.    Because of renal failure will check renal ultrasound also.  Patient will drink more fluids.  Lab work scheduled for next  visit.    Cessation of tobacco use was strongly urged.          No Follow-up on file.

## 2018-02-22 RX ORDER — AMLODIPINE BESYLATE 5 MG/1
TABLET ORAL
Qty: 90 TABLET | Refills: 0 | Status: SHIPPED | OUTPATIENT
Start: 2018-02-22 | End: 2018-05-17 | Stop reason: SDUPTHER

## 2018-03-06 ENCOUNTER — HOSPITAL ENCOUNTER (OUTPATIENT)
Dept: ULTRASOUND IMAGING | Facility: HOSPITAL | Age: 53
Discharge: HOME | End: 2018-03-06
Attending: INTERNAL MEDICINE | Admitting: INTERNAL MEDICINE

## 2018-03-06 DIAGNOSIS — Z79.4 TYPE 2 DIABETES MELLITUS WITH STAGE 3 CHRONIC KIDNEY DISEASE, WITH LONG-TERM CURRENT USE OF INSULIN (HCC): ICD-10-CM

## 2018-03-06 DIAGNOSIS — E11.22 TYPE 2 DIABETES MELLITUS WITH STAGE 3 CHRONIC KIDNEY DISEASE, WITH LONG-TERM CURRENT USE OF INSULIN (HCC): ICD-10-CM

## 2018-03-06 DIAGNOSIS — N18.30 TYPE 2 DIABETES MELLITUS WITH STAGE 3 CHRONIC KIDNEY DISEASE, WITH LONG-TERM CURRENT USE OF INSULIN (HCC): ICD-10-CM

## 2018-03-06 PROCEDURE — 76775 US EXAM ABDO BACK WALL LIM: CPT

## 2018-03-06 PROCEDURE — 76775 US EXAM ABDO BACK WALL LIM: CPT | Performed by: RADIOLOGY

## 2018-03-06 RX ORDER — INSULIN GLARGINE 100 [IU]/ML
INJECTION, SOLUTION SUBCUTANEOUS
Qty: 5 PEN | Refills: 2 | Status: SHIPPED | OUTPATIENT
Start: 2018-03-06 | End: 2018-05-30 | Stop reason: SDUPTHER

## 2018-03-08 RX ORDER — GABAPENTIN 300 MG/1
CAPSULE ORAL
Qty: 112 CAPSULE | Refills: 0 | Status: SHIPPED | OUTPATIENT
Start: 2018-03-08 | End: 2018-05-17 | Stop reason: ALTCHOICE

## 2018-03-27 RX ORDER — PEN NEEDLE, DIABETIC 32GX 5/32"
NEEDLE, DISPOSABLE MISCELLANEOUS
Qty: 30 EACH | Refills: 12 | Status: SHIPPED | OUTPATIENT
Start: 2018-03-27 | End: 2019-01-01 | Stop reason: SDUPTHER

## 2018-04-02 RX ORDER — GABAPENTIN 300 MG/1
CAPSULE ORAL
Qty: 112 CAPSULE | Refills: 0 | OUTPATIENT
Start: 2018-04-02

## 2018-04-26 RX ORDER — LISINOPRIL 20 MG/1
20 TABLET ORAL DAILY
Qty: 90 TABLET | Refills: 0 | Status: SHIPPED | OUTPATIENT
Start: 2018-04-26 | End: 2018-07-18 | Stop reason: SDUPTHER

## 2018-05-16 ENCOUNTER — LAB (OUTPATIENT)
Dept: LAB | Facility: HOSPITAL | Age: 53
End: 2018-05-16

## 2018-05-16 DIAGNOSIS — E11.22 TYPE 2 DIABETES MELLITUS WITH STAGE 3 CHRONIC KIDNEY DISEASE, WITH LONG-TERM CURRENT USE OF INSULIN (HCC): ICD-10-CM

## 2018-05-16 DIAGNOSIS — Z12.5 SCREENING FOR PROSTATE CANCER: ICD-10-CM

## 2018-05-16 DIAGNOSIS — N18.30 TYPE 2 DIABETES MELLITUS WITH STAGE 3 CHRONIC KIDNEY DISEASE, WITH LONG-TERM CURRENT USE OF INSULIN (HCC): ICD-10-CM

## 2018-05-16 DIAGNOSIS — E78.1 HYPERLIPIDEMIA TYPE IV: ICD-10-CM

## 2018-05-16 DIAGNOSIS — Z79.4 TYPE 2 DIABETES MELLITUS WITH STAGE 3 CHRONIC KIDNEY DISEASE, WITH LONG-TERM CURRENT USE OF INSULIN (HCC): ICD-10-CM

## 2018-05-16 LAB
ALBUMIN SERPL-MCNC: 4 G/DL (ref 3.5–5)
ALBUMIN UR-MCNC: 2477.4 MG/L
ALBUMIN/GLOB SERPL: 1.6 G/DL (ref 1.5–2.5)
ALP SERPL-CCNC: 72 U/L (ref 40–129)
ALT SERPL W P-5'-P-CCNC: 23 U/L (ref 10–44)
ANION GAP SERPL CALCULATED.3IONS-SCNC: 4.4 MMOL/L (ref 3.6–11.2)
AST SERPL-CCNC: 24 U/L (ref 10–34)
BASOPHILS # BLD AUTO: 0.05 10*3/MM3 (ref 0–0.3)
BASOPHILS NFR BLD AUTO: 0.4 % (ref 0–2)
BILIRUB SERPL-MCNC: 0.2 MG/DL (ref 0.2–1.8)
BUN BLD-MCNC: 21 MG/DL (ref 7–21)
BUN/CREAT SERPL: 13.5 (ref 7–25)
CALCIUM SPEC-SCNC: 9.1 MG/DL (ref 7.7–10)
CHLORIDE SERPL-SCNC: 111 MMOL/L (ref 99–112)
CHOLEST SERPL-MCNC: 180 MG/DL (ref 0–200)
CK SERPL-CCNC: 547 U/L (ref 24–204)
CO2 SERPL-SCNC: 24.6 MMOL/L (ref 24.3–31.9)
CREAT BLD-MCNC: 1.56 MG/DL (ref 0.43–1.29)
DEPRECATED RDW RBC AUTO: 44.4 FL (ref 37–54)
EOSINOPHIL # BLD AUTO: 0.3 10*3/MM3 (ref 0–0.7)
EOSINOPHIL NFR BLD AUTO: 2.7 % (ref 0–5)
ERYTHROCYTE [DISTWIDTH] IN BLOOD BY AUTOMATED COUNT: 13.4 % (ref 11.5–14.5)
GFR SERPL CREATININE-BSD FRML MDRD: 47 ML/MIN/1.73
GLOBULIN UR ELPH-MCNC: 2.5 GM/DL
GLUCOSE BLD-MCNC: 132 MG/DL (ref 70–110)
HBA1C MFR BLD: 6.6 % (ref 4.5–5.7)
HCT VFR BLD AUTO: 44.7 % (ref 42–52)
HDLC SERPL-MCNC: 31 MG/DL (ref 60–100)
HGB BLD-MCNC: 15 G/DL (ref 14–18)
IMM GRANULOCYTES # BLD: 0.06 10*3/MM3 (ref 0–0.03)
IMM GRANULOCYTES NFR BLD: 0.5 % (ref 0–0.5)
LDLC SERPL CALC-MCNC: 83 MG/DL (ref 0–100)
LDLC/HDLC SERPL: 2.69 {RATIO}
LYMPHOCYTES # BLD AUTO: 1.97 10*3/MM3 (ref 1–3)
LYMPHOCYTES NFR BLD AUTO: 17.5 % (ref 21–51)
MCH RBC QN AUTO: 31.6 PG (ref 27–33)
MCHC RBC AUTO-ENTMCNC: 33.6 G/DL (ref 33–37)
MCV RBC AUTO: 94.3 FL (ref 80–94)
MONOCYTES # BLD AUTO: 1.04 10*3/MM3 (ref 0.1–0.9)
MONOCYTES NFR BLD AUTO: 9.3 % (ref 0–10)
NEUTROPHILS # BLD AUTO: 7.82 10*3/MM3 (ref 1.4–6.5)
NEUTROPHILS NFR BLD AUTO: 69.6 % (ref 30–70)
OSMOLALITY SERPL CALC.SUM OF ELEC: 284.2 MOSM/KG (ref 273–305)
PLATELET # BLD AUTO: 245 10*3/MM3 (ref 130–400)
PMV BLD AUTO: 11.7 FL (ref 6–10)
POTASSIUM BLD-SCNC: 4.3 MMOL/L (ref 3.5–5.3)
PROT SERPL-MCNC: 6.5 G/DL (ref 6–8)
PSA SERPL-MCNC: 0.39 NG/ML (ref 0–4)
RBC # BLD AUTO: 4.74 10*6/MM3 (ref 4.7–6.1)
SODIUM BLD-SCNC: 140 MMOL/L (ref 135–153)
TRIGL SERPL-MCNC: 328 MG/DL (ref 0–150)
VLDLC SERPL-MCNC: 65.6 MG/DL
WBC NRBC COR # BLD: 11.24 10*3/MM3 (ref 4.5–12.5)

## 2018-05-16 PROCEDURE — 36415 COLL VENOUS BLD VENIPUNCTURE: CPT

## 2018-05-16 PROCEDURE — G0103 PSA SCREENING: HCPCS

## 2018-05-16 PROCEDURE — 82550 ASSAY OF CK (CPK): CPT

## 2018-05-16 PROCEDURE — 85025 COMPLETE CBC W/AUTO DIFF WBC: CPT

## 2018-05-16 PROCEDURE — 83036 HEMOGLOBIN GLYCOSYLATED A1C: CPT

## 2018-05-16 PROCEDURE — 82043 UR ALBUMIN QUANTITATIVE: CPT

## 2018-05-16 PROCEDURE — 80061 LIPID PANEL: CPT

## 2018-05-16 PROCEDURE — 80053 COMPREHEN METABOLIC PANEL: CPT

## 2018-05-17 ENCOUNTER — DOCUMENTATION (OUTPATIENT)
Dept: CARDIOLOGY | Facility: CLINIC | Age: 53
End: 2018-05-17

## 2018-05-17 RX ORDER — GABAPENTIN 300 MG/1
800 CAPSULE ORAL 3 TIMES DAILY
COMMUNITY
End: 2020-01-01 | Stop reason: DRUGHIGH

## 2018-05-17 NOTE — PROGRESS NOTES
Per E=Sandeep patient is now receiving Neurontin from Dr. Howard patient must follow  in the future to receive any narcotic refills.

## 2018-05-18 RX ORDER — AMLODIPINE BESYLATE 5 MG/1
TABLET ORAL
Qty: 90 TABLET | Refills: 0 | Status: SHIPPED | OUTPATIENT
Start: 2018-05-18 | End: 2018-08-12 | Stop reason: SDUPTHER

## 2018-05-22 ENCOUNTER — OFFICE VISIT (OUTPATIENT)
Dept: CARDIOLOGY | Facility: CLINIC | Age: 53
End: 2018-05-22

## 2018-05-22 VITALS
SYSTOLIC BLOOD PRESSURE: 128 MMHG | BODY MASS INDEX: 30.31 KG/M2 | WEIGHT: 200 LBS | HEIGHT: 68 IN | RESPIRATION RATE: 16 BRPM | DIASTOLIC BLOOD PRESSURE: 84 MMHG

## 2018-05-22 DIAGNOSIS — N18.2 STAGE 2 CHRONIC KIDNEY DISEASE: ICD-10-CM

## 2018-05-22 DIAGNOSIS — E11.22 TYPE 2 DIABETES MELLITUS WITH STAGE 3 CHRONIC KIDNEY DISEASE, WITH LONG-TERM CURRENT USE OF INSULIN (HCC): ICD-10-CM

## 2018-05-22 DIAGNOSIS — N18.30 TYPE 2 DIABETES MELLITUS WITH STAGE 3 CHRONIC KIDNEY DISEASE, WITH LONG-TERM CURRENT USE OF INSULIN (HCC): ICD-10-CM

## 2018-05-22 DIAGNOSIS — R74.8 ELEVATED CPK: ICD-10-CM

## 2018-05-22 DIAGNOSIS — I10 ESSENTIAL HYPERTENSION: ICD-10-CM

## 2018-05-22 DIAGNOSIS — E78.1 HYPERLIPIDEMIA TYPE IV: Primary | ICD-10-CM

## 2018-05-22 DIAGNOSIS — E66.9 CLASS 1 OBESITY WITHOUT SERIOUS COMORBIDITY WITH BODY MASS INDEX (BMI) OF 30.0 TO 30.9 IN ADULT, UNSPECIFIED OBESITY TYPE: ICD-10-CM

## 2018-05-22 DIAGNOSIS — Z72.0 TOBACCO USE: ICD-10-CM

## 2018-05-22 DIAGNOSIS — Z79.4 TYPE 2 DIABETES MELLITUS WITH STAGE 3 CHRONIC KIDNEY DISEASE, WITH LONG-TERM CURRENT USE OF INSULIN (HCC): ICD-10-CM

## 2018-05-22 PROCEDURE — 99214 OFFICE O/P EST MOD 30 MIN: CPT | Performed by: INTERNAL MEDICINE

## 2018-05-22 RX ORDER — PREDNISONE 1 MG/1
TABLET ORAL
Qty: 36 TABLET | Refills: 0 | Status: SHIPPED | OUTPATIENT
Start: 2018-05-22 | End: 2018-10-03

## 2018-05-22 RX ORDER — PREDNISONE 1 MG/1
5 TABLET ORAL DAILY
Qty: 90 TABLET | Refills: 1 | Status: SHIPPED | OUTPATIENT
Start: 2018-05-22 | End: 2018-05-22

## 2018-05-22 NOTE — PROGRESS NOTES
subjective     Chief Complaint   Patient presents with   • Results     Labs   • Hyperlipidemia   • Type 2 diabetes mellitus w diabetic polyneuropathy     c/o bilat foot pain, afraid to use cream prescribed due to side effects     History of Present Illness    Patient is 52 years old white male who has history of hyperlipidemia and diabetes mellitus.  Unfortunately he has markedly elevated CPK and is not on statin therapy.  He is here for follow-up and lab work which will be discussed today    He also complains of a lot of burning numbness tingling and hurting in his feet and lower extremities.  He had been taking Neurontin 300 mg 3 times a day.  He was sent to Dr. salmon for evaluation he increase Neurontin 800 3 times a day.  Patient is somewhat better but still feels that he is having lot of problems.    He was also sent to the podiatrist who started patient on the diclofenac cream 1% twice a day.  Patient brought a list of his sugar monitor which he checks 3 times a day.  Those were reviewed and discussed with the patient    Blood pressure is also a problem.  He is requiring hydralazine, Norvasc and lisinopril taking his blood pressure control.  He has no drug side effects.    Diabetes is controlled with basaglar or and Humalog.  He checks his sugar 3 times a day.  Patient has not lost any weight.        Past Surgical History:   Procedure Laterality Date   • CARDIOVASCULAR STRESS TEST  04/2015   • ECHO - CONVERTED  10/2014   • TEETH EXTRACTION       Family History   Problem Relation Age of Onset   • Cancer Sister    • Diabetes Sister      Past Medical History:   Diagnosis Date   • Diabetes mellitus    • Hyperlipidemia    • Hypertension    • Leg cramps    • Obesity    • Peripheral neuritis    • Tobacco use      Patient Active Problem List   Diagnosis   • Leg cramps*   • Peripheral neuritis*   • Obesity*   • Tobacco use*   • Hyperlipidemia type IV*   • Hypertension*   • Type 2 diabetes mellitus with chronic kidney  disease, with long-term current use of insulin   • Chronic fatigue   • Elevated CPK   • Chronic kidney disease       Social History   Substance Use Topics   • Smoking status: Current Every Day Smoker     Packs/day: 1.50     Years: 35.00     Types: Cigarettes   • Smokeless tobacco: Never Used   • Alcohol use No       No Known Allergies    Current Outpatient Prescriptions on File Prior to Visit   Medication Sig   • amLODIPine (NORVASC) 5 MG tablet TAKE 1 TABLET BY MOUTH DAILY   • BD PEN NEEDLE NOLBERTO U/F 32G X 4 MM misc USE FOUR TIMES DAILY AS DIRECTED   • Blood Glucose Monitoring Suppl (ONE TOUCH ULTRA 2) W/DEVICE kit 1 kit 3 (Three) Times a Day. E11.9   • gabapentin (NEURONTIN) 300 MG capsule Take 800 mg by mouth 3 (Three) Times a Day.   • glucose blood test strip Pt needs to check blood sugar 3 times a day E11.9   • HUMALOG KWIKPEN 100 UNIT/ML solution pen-injector INJECT 10 UNITS SUBCUTANEOUS THREE TIMES DAILY BEFORE MEALS   • hydrALAZINE (APRESOLINE) 25 MG tablet Take 1 tablet by mouth 3 (Three) Times a Day.   • Insulin Glargine (BASAGLAR KWIKPEN) 100 UNIT/ML injection pen INJECT 45 UNITS UNDER THE SKIN EVERY DAY   • Insulin Lispro (HUMALOG KWIKPEN) 100 UNIT/ML solution pen-injector Inject 10 Units under the skin 3 (Three) Times a Day After Meals.   • Lancets (ONETOUCH ULTRASOFT) lancets Pt needs to check blood sugar 3 times a day E11.9   • lisinopril (PRINIVIL,ZESTRIL) 20 MG tablet TAKE 1 TABLET BY MOUTH DAILY     No current facility-administered medications on file prior to visit.          The following portions of the patient's history were reviewed and updated as appropriate: allergies, current medications, past family history, past medical history, past social history, past surgical history and problem list.    Review of Systems   Constitution: Negative.   HENT: Negative.  Negative for congestion.    Eyes: Negative.    Cardiovascular: Negative.  Negative for chest pain, cyanosis, dyspnea on exertion, irregular  "heartbeat, leg swelling, near-syncope, orthopnea, palpitations, paroxysmal nocturnal dyspnea and syncope.   Respiratory: Negative.  Negative for shortness of breath.    Endocrine: Negative.    Hematologic/Lymphatic: Negative.    Musculoskeletal: Positive for muscle cramps and myalgias.   Gastrointestinal: Negative.    Genitourinary: Negative.    Neurological: Positive for numbness and paresthesias. Negative for headaches.   Psychiatric/Behavioral: Negative.           Objective:     /84 (BP Location: Left arm, Patient Position: Sitting, Cuff Size: Adult)   Resp 16   Ht 172.7 cm (68\")   Wt 90.7 kg (200 lb)   BMI 30.41 kg/m²   Physical Exam   Constitutional: He appears well-developed and well-nourished. No distress.   HENT:   Head: Normocephalic and atraumatic.   Mouth/Throat: Oropharynx is clear and moist. No oropharyngeal exudate.   Eyes: Conjunctivae and EOM are normal. Pupils are equal, round, and reactive to light. No scleral icterus.   Neck: Normal range of motion. Neck supple. No JVD present. No tracheal deviation present. No thyromegaly present.   Cardiovascular: Normal rate, regular rhythm, normal heart sounds and intact distal pulses.  PMI is not displaced.  Exam reveals no gallop, no friction rub and no decreased pulses.    No murmur heard.  Pulses:       Carotid pulses are 3+ on the right side, and 3+ on the left side.       Radial pulses are 3+ on the right side, and 3+ on the left side.   Pulmonary/Chest: Effort normal and breath sounds normal. No respiratory distress. He has no wheezes. He has no rales. He exhibits no tenderness.   Abdominal: Soft. Bowel sounds are normal. He exhibits no distension, no abdominal bruit and no mass. There is no splenomegaly or hepatomegaly. There is no tenderness. There is no rebound and no guarding.   Musculoskeletal: Normal range of motion. He exhibits no edema, tenderness or deformity.   Patient has 1\" x 1\" cystic mass on the right forehead which is not " changed in last few years.  Patient does not wish it removed   Lymphadenopathy:     He has no cervical adenopathy.   Neurological: He is alert. He has normal reflexes. No cranial nerve deficit. He exhibits normal muscle tone. Coordination normal.   Skin: Skin is warm and dry. No rash noted. He is not diaphoretic. No erythema.   Psychiatric: He has a normal mood and affect. His behavior is normal. Judgment and thought content normal.         Lab Review  Lab Results   Component Value Date     05/16/2018    K 4.3 05/16/2018     05/16/2018    BUN 21 05/16/2018    CREATININE 1.56 (H) 05/16/2018    GLUCOSE 132 (H) 05/16/2018    CALCIUM 9.1 05/16/2018    ALT 23 05/16/2018    ALKPHOS 72 05/16/2018    LABIL2 1.6 05/16/2018     Lab Results   Component Value Date    CKTOTAL 547 (C) 05/16/2018     Lab Results   Component Value Date    WBC 11.24 05/16/2018    HGB 15.0 05/16/2018    HCT 44.7 05/16/2018     05/16/2018     No results found for: INR  No results found for: MG  Lab Results   Component Value Date    PSA 0.390 05/16/2018    TSH 2.812 12/06/2016     No results found for: BNP  Lab Results   Component Value Date    CHLPL 128 01/29/2016    CHOL 180 05/16/2018    TRIG 328 (H) 05/16/2018    HDL 31 (L) 05/16/2018    VLDL 65.6 05/16/2018    LDLHDL 2.69 05/16/2018     Lab Results   Component Value Date    LDL 83 05/16/2018    LDL 50 11/06/2017       Procedures       I personally viewed and interpreted the patient's LAB data         Assessment:     1. Hyperlipidemia type IV*    2. Class 1 obesity without serious comorbidity with body mass index (BMI) of 30.0 to 30.9 in adult, unspecified obesity type    3. Type 2 diabetes mellitus with stage 3 chronic kidney disease, with long-term current use of insulin    4. Tobacco use*    5. Elevated CPK    6. Stage 2 chronic kidney disease    7. Essential hypertension          Plan:      Lab work reviewed and discussed with the patient CPK is 547.  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  We will try low-dose steroids.  Sugar probably will get worse patient was instructed for coverage.    Creatinine is elevated patient was advised to drink more fluids.  Her sugar is around 132.  Triglyceride elevated at 328.    Healthy lifestyle weight loss and exercise recommended.    Refills of medications were given.  He will try prednisone 10 mg daily for a week followed by 5 mg daily for 2 weeks followed by 5 mg every other day for 2 weeks.  We'll check his CPK next visit.  Cessation of smoking was strongly urged.    Follow-up scheduled      No Follow-up on file.

## 2018-05-30 RX ORDER — INSULIN GLARGINE 100 [IU]/ML
INJECTION, SOLUTION SUBCUTANEOUS
Qty: 15 ML | Refills: 12 | Status: SHIPPED | OUTPATIENT
Start: 2018-05-30 | End: 2019-01-01 | Stop reason: SDUPTHER

## 2018-07-10 RX ORDER — HYDRALAZINE HYDROCHLORIDE 25 MG/1
TABLET, FILM COATED ORAL
Qty: 90 TABLET | Refills: 0 | Status: SHIPPED | OUTPATIENT
Start: 2018-07-10 | End: 2018-08-06 | Stop reason: SDUPTHER

## 2018-07-10 RX ORDER — INSULIN LISPRO 100 [IU]/ML
INJECTION, SOLUTION INTRAVENOUS; SUBCUTANEOUS
Qty: 90 ML | Refills: 0 | Status: SHIPPED | OUTPATIENT
Start: 2018-07-10 | End: 2018-09-12 | Stop reason: ALTCHOICE

## 2018-07-18 RX ORDER — LISINOPRIL 20 MG/1
20 TABLET ORAL DAILY
Qty: 90 TABLET | Refills: 3 | Status: SHIPPED | OUTPATIENT
Start: 2018-07-18 | End: 2019-02-18

## 2018-08-06 RX ORDER — HYDRALAZINE HYDROCHLORIDE 25 MG/1
TABLET, FILM COATED ORAL
Qty: 90 TABLET | Refills: 0 | Status: SHIPPED | OUTPATIENT
Start: 2018-08-06 | End: 2018-09-02 | Stop reason: SDUPTHER

## 2018-08-13 RX ORDER — AMLODIPINE BESYLATE 5 MG/1
TABLET ORAL
Qty: 90 TABLET | Refills: 0 | Status: SHIPPED | OUTPATIENT
Start: 2018-08-13 | End: 2018-11-03 | Stop reason: SDUPTHER

## 2018-09-04 RX ORDER — HYDRALAZINE HYDROCHLORIDE 25 MG/1
TABLET, FILM COATED ORAL
Qty: 90 TABLET | Refills: 0 | Status: SHIPPED | OUTPATIENT
Start: 2018-09-04 | End: 2018-10-01 | Stop reason: SDUPTHER

## 2018-09-28 ENCOUNTER — LAB (OUTPATIENT)
Dept: LAB | Facility: HOSPITAL | Age: 53
End: 2018-09-28

## 2018-09-28 DIAGNOSIS — E78.1 HYPERLIPIDEMIA TYPE IV: ICD-10-CM

## 2018-09-28 DIAGNOSIS — R74.8 ELEVATED CPK: ICD-10-CM

## 2018-09-28 LAB — CK SERPL-CCNC: 235 U/L (ref 24–204)

## 2018-09-28 PROCEDURE — 82550 ASSAY OF CK (CPK): CPT

## 2018-10-02 RX ORDER — HYDRALAZINE HYDROCHLORIDE 25 MG/1
TABLET, FILM COATED ORAL
Qty: 90 TABLET | Refills: 0 | Status: SHIPPED | OUTPATIENT
Start: 2018-10-02 | End: 2018-10-28 | Stop reason: SDUPTHER

## 2018-10-03 ENCOUNTER — OFFICE VISIT (OUTPATIENT)
Dept: CARDIOLOGY | Facility: CLINIC | Age: 53
End: 2018-10-03

## 2018-10-03 VITALS
DIASTOLIC BLOOD PRESSURE: 86 MMHG | HEART RATE: 69 BPM | OXYGEN SATURATION: 98 % | HEIGHT: 68 IN | SYSTOLIC BLOOD PRESSURE: 150 MMHG | WEIGHT: 197 LBS | BODY MASS INDEX: 29.86 KG/M2

## 2018-10-03 DIAGNOSIS — Z23 NEED FOR PROPHYLACTIC VACCINATION AND INOCULATION AGAINST INFLUENZA: Primary | ICD-10-CM

## 2018-10-03 DIAGNOSIS — Z79.4 TYPE 2 DIABETES MELLITUS WITH STAGE 3 CHRONIC KIDNEY DISEASE, WITH LONG-TERM CURRENT USE OF INSULIN (HCC): ICD-10-CM

## 2018-10-03 DIAGNOSIS — R22.0 MASS OF HEAD: ICD-10-CM

## 2018-10-03 DIAGNOSIS — I10 ESSENTIAL HYPERTENSION: ICD-10-CM

## 2018-10-03 DIAGNOSIS — E11.22 TYPE 2 DIABETES MELLITUS WITH STAGE 3 CHRONIC KIDNEY DISEASE, WITH LONG-TERM CURRENT USE OF INSULIN (HCC): ICD-10-CM

## 2018-10-03 DIAGNOSIS — Z72.0 TOBACCO USE: ICD-10-CM

## 2018-10-03 DIAGNOSIS — IMO0002 MASS: ICD-10-CM

## 2018-10-03 DIAGNOSIS — E78.1 HYPERLIPIDEMIA TYPE IV: ICD-10-CM

## 2018-10-03 DIAGNOSIS — N18.30 TYPE 2 DIABETES MELLITUS WITH STAGE 3 CHRONIC KIDNEY DISEASE, WITH LONG-TERM CURRENT USE OF INSULIN (HCC): ICD-10-CM

## 2018-10-03 PROCEDURE — 99214 OFFICE O/P EST MOD 30 MIN: CPT | Performed by: INTERNAL MEDICINE

## 2018-10-03 PROCEDURE — 90674 CCIIV4 VAC NO PRSV 0.5 ML IM: CPT | Performed by: INTERNAL MEDICINE

## 2018-10-03 PROCEDURE — 90471 IMMUNIZATION ADMIN: CPT | Performed by: INTERNAL MEDICINE

## 2018-10-03 NOTE — PROGRESS NOTES
"subjective     Chief Complaint   Patient presents with   • Results     Lab   • Type 2 Diabetes mellitus     \"Doing good\" Has a log with him   • Head lesion     Concerned with this, has been draining x 1 month, painful and itching     History of Present Illness    Patient is 53 years old white male who is here for follow-up of multiple medical problems.  Patient complains of a mass on the right forehead.  This has been there for years.  Patient has been declining to have it surgically removed but now he says that is draining a little bit and is interested in surgical removal.  Patient has essential hypertension he is taking his medications regularly.  Blood pressure has been very good at home.    He is diabetic blood sugar is running little bit high.  Patient brought the list of his blood sugar 3 times a day which was reviewed.  Patient has not lost any weight.  In is ongoing tobacco use.    Patient is diabetic but he is not able to take statin therapy    Patient also has peripheral neuropathy and is taking Neurontin and directions of Dr. Howard.    Past Surgical History:   Procedure Laterality Date   • CARDIOVASCULAR STRESS TEST  04/2015   • ECHO - CONVERTED  10/2014   • TEETH EXTRACTION       Family History   Problem Relation Age of Onset   • Cancer Sister    • Diabetes Sister      Past Medical History:   Diagnosis Date   • Diabetes mellitus (CMS/HCC)    • Hyperlipidemia    • Hypertension    • Leg cramps    • Obesity    • Peripheral neuritis    • Tobacco use      Patient Active Problem List   Diagnosis   • Leg cramps*   • Peripheral neuritis*   • Obesity*   • Tobacco use*   • Hyperlipidemia type IV*   • Hypertension*   • Type 2 diabetes mellitus with chronic kidney disease, with long-term current use of insulin (CMS/HCC)   • Chronic fatigue   • Elevated CPK   • Chronic kidney disease   • Mass, right forehead       Social History   Substance Use Topics   • Smoking status: Current Every Day Smoker     Packs/day: 1.50 "     Years: 35.00     Types: Cigarettes   • Smokeless tobacco: Never Used   • Alcohol use No       No Known Allergies    Current Outpatient Prescriptions on File Prior to Visit   Medication Sig   • amLODIPine (NORVASC) 5 MG tablet TAKE 1 TABLET BY MOUTH DAILY   • BD PEN NEEDLE NOLBERTO U/F 32G X 4 MM misc USE FOUR TIMES DAILY AS DIRECTED   • Blood Glucose Monitoring Suppl (ONE TOUCH ULTRA 2) W/DEVICE kit 1 kit 3 (Three) Times a Day. E11.9   • gabapentin (NEURONTIN) 300 MG capsule Take 800 mg by mouth 3 (Three) Times a Day.   • hydrALAZINE (APRESOLINE) 25 MG tablet TAKE 1 TABLET BY MOUTH THREE TIMES DAILY   • Insulin Glargine (BASAGLAR KWIKPEN) 100 UNIT/ML injection pen INJECT 45 UNITS UNDER THE SKIN EVERY DAY   • Lancets (ONETOUCH ULTRASOFT) lancets Pt needs to check blood sugar 3 times a day E11.9   • lisinopril (PRINIVIL,ZESTRIL) 20 MG tablet Take 1 tablet by mouth Daily.   • ONE TOUCH ULTRA TEST test strip USE TO TEST BLOOD SUGAR THREE TIMES DAILY   • [DISCONTINUED] predniSONE (DELTASONE) 5 MG tablet 2 daily x 7 days,  1 daily x 14 days , then 1 qod x 14 days   • [DISCONTINUED] Insulin Lispro (HUMALOG KWIKPEN) 100 UNIT/ML solution pen-injector Inject 10 Units under the skin 3 (Three) Times a Day After Meals.     No current facility-administered medications on file prior to visit.          The following portions of the patient's history were reviewed and updated as appropriate: allergies, current medications, past family history, past medical history, past social history, past surgical history and problem list.    Review of Systems   Constitution: Negative.   HENT: Negative.  Negative for congestion.    Eyes: Negative.    Cardiovascular: Negative.  Negative for chest pain, cyanosis, dyspnea on exertion, irregular heartbeat, leg swelling, near-syncope, orthopnea, palpitations, paroxysmal nocturnal dyspnea and syncope.   Respiratory: Negative.  Negative for shortness of breath.    Hematologic/Lymphatic: Negative.   "  Skin:        Mass on the right forehead which is draining   Musculoskeletal: Negative.    Gastrointestinal: Negative.    Neurological: Negative.  Negative for headaches.          Objective:     /86 (BP Location: Left arm, Patient Position: Sitting, Cuff Size: Adult)   Pulse 69   Ht 172.7 cm (68\")   Wt 89.4 kg (197 lb)   SpO2 98%   BMI 29.95 kg/m²   Physical Exam   Constitutional: He appears well-developed and well-nourished. No distress.   HENT:   Head: Normocephalic and atraumatic.   Mouth/Throat: Oropharynx is clear and moist. No oropharyngeal exudate.   Eyes: Pupils are equal, round, and reactive to light. Conjunctivae and EOM are normal. No scleral icterus.   Neck: Normal range of motion. Neck supple. No JVD present. No tracheal deviation present. No thyromegaly present.   Cardiovascular: Normal rate, regular rhythm, normal heart sounds and intact distal pulses.  PMI is not displaced.  Exam reveals no gallop, no friction rub and no decreased pulses.    No murmur heard.  Pulses:       Carotid pulses are 3+ on the right side, and 3+ on the left side.       Radial pulses are 3+ on the right side, and 3+ on the left side.   Pulmonary/Chest: Effort normal and breath sounds normal. No respiratory distress. He has no wheezes. He has no rales. He exhibits no tenderness.   Abdominal: Soft. Bowel sounds are normal. He exhibits no distension, no abdominal bruit and no mass. There is no splenomegaly or hepatomegaly. There is no tenderness. There is no rebound and no guarding.   Musculoskeletal: Normal range of motion. He exhibits no edema, tenderness or deformity.   Lymphadenopathy:     He has no cervical adenopathy.   Neurological: He is alert. He has normal reflexes. No cranial nerve deficit. He exhibits normal muscle tone. Coordination normal.   Skin: Skin is warm and dry. No rash noted. He is not diaphoretic. No erythema.   Mass on the forehead 1\" x 1\" which is draining   Psychiatric: He has a normal mood " and affect. His behavior is normal. Judgment and thought content normal.         Lab Review  Lab Results   Component Value Date     05/16/2018    K 4.3 05/16/2018     05/16/2018    BUN 21 05/16/2018    CREATININE 1.56 (H) 05/16/2018    GLUCOSE 132 (H) 05/16/2018    CALCIUM 9.1 05/16/2018    ALT 23 05/16/2018    ALKPHOS 72 05/16/2018    LABIL2 1.5 01/29/2016     Lab Results   Component Value Date    CKTOTAL 235 (H) 09/28/2018     Lab Results   Component Value Date    WBC 11.24 05/16/2018    HGB 15.0 05/16/2018    HCT 44.7 05/16/2018     05/16/2018     No results found for: INR  No results found for: MG  Lab Results   Component Value Date    PSA 0.390 05/16/2018    TSH 2.812 12/06/2016     No results found for: BNP  Lab Results   Component Value Date    CHLPL 128 01/29/2016    CHOL 180 05/16/2018    TRIG 328 (H) 05/16/2018    HDL 31 (L) 05/16/2018    VLDL 65.6 05/16/2018    LDLHDL 2.69 05/16/2018     Lab Results   Component Value Date    LDL 83 05/16/2018    LDL 50 11/06/2017       Procedures       I personally viewed and interpreted the patient's LAB data         Assessment:     1. Need for prophylactic vaccination and inoculation against influenza    2. Mass of head    3. Essential hypertension    4. Hyperlipidemia type IV*    5. Type 2 diabetes mellitus with stage 3 chronic kidney disease, with long-term current use of insulin (CMS/[facility])    6. Tobacco use*    7. Mass, right forehead          Plan:      Patient has essential hypertension.  He is taking Norvasc 5 mg daily, hydralazine 25 3 times a day and lisinopril 20 daily.  Blood pressure is mildly elevated today however patient says the blood pressure is very good at home.  He was advised to check his blood pressure closely and make sure is below 140 systolic.  If it is elevated we will increase lisinopril to 30 mg daily.    Lab work scheduled for next visit.    Sugar has been mildly elevated he was advised to increase her regular insulin 12  units in the morning 10 with lunch and 10 with supper.    Dermatology consultation was arranged.  Weight loss was emphasized.  Cessation of tobacco use was stressed.  Follow-up scheduled with lab work        No Follow-up on file.

## 2018-10-19 ENCOUNTER — TELEPHONE (OUTPATIENT)
Dept: CARDIOLOGY | Facility: CLINIC | Age: 53
End: 2018-10-19

## 2018-10-19 NOTE — TELEPHONE ENCOUNTER
Patient says when he was here he forgot to ask about getting diabetic shoes. He wants to know if he can get some?     Thanks!

## 2018-10-29 RX ORDER — HYDRALAZINE HYDROCHLORIDE 25 MG/1
TABLET, FILM COATED ORAL
Qty: 90 TABLET | Refills: 0 | Status: SHIPPED | OUTPATIENT
Start: 2018-10-29 | End: 2018-12-02 | Stop reason: SDUPTHER

## 2018-11-05 RX ORDER — AMLODIPINE BESYLATE 5 MG/1
TABLET ORAL
Qty: 90 TABLET | Refills: 0 | Status: SHIPPED | OUTPATIENT
Start: 2018-11-05 | End: 2019-01-26 | Stop reason: SDUPTHER

## 2018-12-03 RX ORDER — HYDRALAZINE HYDROCHLORIDE 25 MG/1
TABLET, FILM COATED ORAL
Qty: 90 TABLET | Refills: 0 | Status: SHIPPED | OUTPATIENT
Start: 2018-12-03 | End: 2018-12-29 | Stop reason: SDUPTHER

## 2018-12-31 RX ORDER — HYDRALAZINE HYDROCHLORIDE 25 MG/1
TABLET, FILM COATED ORAL
Qty: 180 TABLET | Refills: 0 | Status: SHIPPED | OUTPATIENT
Start: 2018-12-31 | End: 2019-01-01 | Stop reason: SDUPTHER

## 2019-01-01 ENCOUNTER — LAB (OUTPATIENT)
Dept: LAB | Facility: HOSPITAL | Age: 54
End: 2019-01-01

## 2019-01-01 ENCOUNTER — OFFICE VISIT (OUTPATIENT)
Dept: CARDIOLOGY | Facility: CLINIC | Age: 54
End: 2019-01-01

## 2019-01-01 ENCOUNTER — RESULTS ENCOUNTER (OUTPATIENT)
Dept: CARDIOLOGY | Facility: CLINIC | Age: 54
End: 2019-01-01

## 2019-01-01 ENCOUNTER — APPOINTMENT (OUTPATIENT)
Dept: CT IMAGING | Facility: HOSPITAL | Age: 54
End: 2019-01-01

## 2019-01-01 ENCOUNTER — HOSPITAL ENCOUNTER (OUTPATIENT)
Dept: ULTRASOUND IMAGING | Facility: HOSPITAL | Age: 54
Discharge: HOME | End: 2019-09-09
Admitting: INTERNAL MEDICINE

## 2019-01-01 ENCOUNTER — APPOINTMENT (OUTPATIENT)
Dept: GENERAL RADIOLOGY | Facility: HOSPITAL | Age: 54
End: 2019-01-01

## 2019-01-01 ENCOUNTER — HOSPITAL ENCOUNTER (OUTPATIENT)
Dept: ULTRASOUND IMAGING | Facility: HOSPITAL | Age: 54
Discharge: HOME | End: 2019-04-25
Admitting: INTERNAL MEDICINE

## 2019-01-01 ENCOUNTER — HOSPITAL ENCOUNTER (EMERGENCY)
Facility: HOSPITAL | Age: 54
Discharge: HOME | End: 2019-05-20
Attending: EMERGENCY MEDICINE | Admitting: EMERGENCY MEDICINE

## 2019-01-01 ENCOUNTER — TRANSCRIBE ORDERS (OUTPATIENT)
Dept: ADMINISTRATIVE | Facility: HOSPITAL | Age: 54
End: 2019-01-01

## 2019-01-01 ENCOUNTER — TELEPHONE (OUTPATIENT)
Dept: CARDIOLOGY | Facility: CLINIC | Age: 54
End: 2019-01-01

## 2019-01-01 VITALS
HEIGHT: 68 IN | WEIGHT: 186 LBS | BODY MASS INDEX: 28.19 KG/M2 | DIASTOLIC BLOOD PRESSURE: 84 MMHG | OXYGEN SATURATION: 93 % | HEART RATE: 81 BPM | SYSTOLIC BLOOD PRESSURE: 136 MMHG

## 2019-01-01 VITALS
DIASTOLIC BLOOD PRESSURE: 80 MMHG | HEIGHT: 68 IN | SYSTOLIC BLOOD PRESSURE: 138 MMHG | HEART RATE: 83 BPM | OXYGEN SATURATION: 94 % | WEIGHT: 175 LBS | BODY MASS INDEX: 26.52 KG/M2

## 2019-01-01 VITALS
SYSTOLIC BLOOD PRESSURE: 134 MMHG | DIASTOLIC BLOOD PRESSURE: 76 MMHG | BODY MASS INDEX: 28.49 KG/M2 | OXYGEN SATURATION: 93 % | HEART RATE: 72 BPM | WEIGHT: 188 LBS | HEIGHT: 68 IN

## 2019-01-01 VITALS
SYSTOLIC BLOOD PRESSURE: 142 MMHG | BODY MASS INDEX: 26.67 KG/M2 | HEIGHT: 68 IN | HEART RATE: 85 BPM | OXYGEN SATURATION: 100 % | DIASTOLIC BLOOD PRESSURE: 82 MMHG | WEIGHT: 176 LBS

## 2019-01-01 VITALS
RESPIRATION RATE: 16 BRPM | HEART RATE: 66 BPM | HEIGHT: 68 IN | BODY MASS INDEX: 28.04 KG/M2 | OXYGEN SATURATION: 98 % | SYSTOLIC BLOOD PRESSURE: 134 MMHG | TEMPERATURE: 98 F | DIASTOLIC BLOOD PRESSURE: 89 MMHG | WEIGHT: 185 LBS

## 2019-01-01 DIAGNOSIS — N18.30 STAGE 3 CHRONIC KIDNEY DISEASE (HCC): ICD-10-CM

## 2019-01-01 DIAGNOSIS — N18.30 TYPE 2 DIABETES MELLITUS WITH STAGE 3 CHRONIC KIDNEY DISEASE, WITH LONG-TERM CURRENT USE OF INSULIN (HCC): ICD-10-CM

## 2019-01-01 DIAGNOSIS — E16.2 HYPOGLYCEMIA: Primary | ICD-10-CM

## 2019-01-01 DIAGNOSIS — E78.1 HYPERLIPIDEMIA TYPE IV: ICD-10-CM

## 2019-01-01 DIAGNOSIS — Z72.0 TOBACCO USE: ICD-10-CM

## 2019-01-01 DIAGNOSIS — Z79.4 TYPE 2 DIABETES MELLITUS WITH STAGE 3 CHRONIC KIDNEY DISEASE, WITH LONG-TERM CURRENT USE OF INSULIN (HCC): ICD-10-CM

## 2019-01-01 DIAGNOSIS — E11.22 TYPE 2 DIABETES MELLITUS WITH STAGE 3 CHRONIC KIDNEY DISEASE, WITH LONG-TERM CURRENT USE OF INSULIN (HCC): ICD-10-CM

## 2019-01-01 DIAGNOSIS — E66.9 CLASS 1 OBESITY WITHOUT SERIOUS COMORBIDITY WITH BODY MASS INDEX (BMI) OF 30.0 TO 30.9 IN ADULT, UNSPECIFIED OBESITY TYPE: ICD-10-CM

## 2019-01-01 DIAGNOSIS — G62.9 PERIPHERAL NEURITIS: ICD-10-CM

## 2019-01-01 DIAGNOSIS — Z12.11 COLON CANCER SCREENING: ICD-10-CM

## 2019-01-01 DIAGNOSIS — R74.8 ELEVATED CPK: ICD-10-CM

## 2019-01-01 DIAGNOSIS — N18.30 CHRONIC KIDNEY DISEASE, STAGE III (MODERATE) (HCC): ICD-10-CM

## 2019-01-01 DIAGNOSIS — N18.2 STAGE 2 CHRONIC KIDNEY DISEASE: ICD-10-CM

## 2019-01-01 DIAGNOSIS — R53.82 CHRONIC FATIGUE: ICD-10-CM

## 2019-01-01 DIAGNOSIS — N28.9 ABNORMAL RENAL FUNCTION: ICD-10-CM

## 2019-01-01 DIAGNOSIS — E78.1 HYPERLIPIDEMIA TYPE IV: Primary | ICD-10-CM

## 2019-01-01 DIAGNOSIS — I10 ESSENTIAL HYPERTENSION: Primary | ICD-10-CM

## 2019-01-01 DIAGNOSIS — I10 ESSENTIAL HYPERTENSION: ICD-10-CM

## 2019-01-01 DIAGNOSIS — Z79.4 TYPE 2 DIABETES MELLITUS WITH STAGE 3 CHRONIC KIDNEY DISEASE, WITH LONG-TERM CURRENT USE OF INSULIN (HCC): Primary | ICD-10-CM

## 2019-01-01 DIAGNOSIS — N18.30 CHRONIC KIDNEY DISEASE, STAGE III (MODERATE) (HCC): Primary | ICD-10-CM

## 2019-01-01 DIAGNOSIS — N52.9 ERECTILE DYSFUNCTION, UNSPECIFIED ERECTILE DYSFUNCTION TYPE: ICD-10-CM

## 2019-01-01 DIAGNOSIS — N28.9 ABNORMAL RENAL FUNCTION: Primary | ICD-10-CM

## 2019-01-01 DIAGNOSIS — Z12.11 SCREENING FOR COLON CANCER: ICD-10-CM

## 2019-01-01 DIAGNOSIS — E11.22 TYPE 2 DIABETES MELLITUS WITH STAGE 3 CHRONIC KIDNEY DISEASE, WITH LONG-TERM CURRENT USE OF INSULIN (HCC): Primary | ICD-10-CM

## 2019-01-01 DIAGNOSIS — N18.30 TYPE 2 DIABETES MELLITUS WITH STAGE 3 CHRONIC KIDNEY DISEASE, WITH LONG-TERM CURRENT USE OF INSULIN (HCC): Primary | ICD-10-CM

## 2019-01-01 LAB
A-A DO2: 20 MMHG (ref 0–300)
ALBUMIN SERPL-MCNC: 3.28 G/DL (ref 3.5–5.2)
ALBUMIN SERPL-MCNC: 3.6 G/DL (ref 3.5–5.2)
ALBUMIN SERPL-MCNC: 3.6 G/DL (ref 3.5–5.2)
ALBUMIN SERPL-MCNC: 3.8 G/DL (ref 3.5–5.2)
ALBUMIN UR-MCNC: 188.7 MG/DL
ALBUMIN UR-MCNC: >440 MG/L
ALBUMIN/GLOB SERPL: 1.2 G/DL
ALBUMIN/GLOB SERPL: 1.5 G/DL
ALP SERPL-CCNC: 67 U/L (ref 39–117)
ALP SERPL-CCNC: 73 U/L (ref 39–117)
ALP SERPL-CCNC: 86 U/L (ref 39–117)
ALP SERPL-CCNC: 87 U/L (ref 39–117)
ALT SERPL W P-5'-P-CCNC: 17 U/L (ref 1–41)
ALT SERPL W P-5'-P-CCNC: 19 U/L (ref 1–41)
ALT SERPL W P-5'-P-CCNC: 25 U/L (ref 1–41)
ALT SERPL W P-5'-P-CCNC: 27 U/L (ref 1–41)
AMORPH URATE CRY URNS QL MICRO: ABNORMAL /HPF
ANION GAP SERPL CALCULATED.3IONS-SCNC: 10.7 MMOL/L (ref 5–15)
ANION GAP SERPL CALCULATED.3IONS-SCNC: 11.5 MMOL/L (ref 5–15)
ANION GAP SERPL CALCULATED.3IONS-SCNC: 12.3 MMOL/L
ANION GAP SERPL CALCULATED.3IONS-SCNC: 12.7 MMOL/L
ARTERIAL PATENCY WRIST A: POSITIVE
AST SERPL-CCNC: 15 U/L (ref 1–40)
AST SERPL-CCNC: 21 U/L (ref 1–40)
AST SERPL-CCNC: 23 U/L (ref 1–40)
AST SERPL-CCNC: 27 U/L (ref 1–40)
ATMOSPHERIC PRESS: 727 MMHG
BACTERIA UR QL AUTO: ABNORMAL /HPF
BASE EXCESS BLDA CALC-SCNC: -5.8 MMOL/L
BASOPHILS # BLD AUTO: 0.02 10*3/MM3 (ref 0–0.2)
BASOPHILS # BLD AUTO: 0.05 10*3/MM3 (ref 0–0.2)
BASOPHILS # BLD AUTO: 0.06 10*3/MM3 (ref 0–0.2)
BASOPHILS # BLD AUTO: 0.07 10*3/MM3 (ref 0–0.2)
BASOPHILS NFR BLD AUTO: 0.2 % (ref 0–1.5)
BASOPHILS NFR BLD AUTO: 0.5 % (ref 0–1.5)
BASOPHILS NFR BLD AUTO: 0.5 % (ref 0–1.5)
BASOPHILS NFR BLD AUTO: 0.7 % (ref 0–1.5)
BDY SITE: ABNORMAL
BILIRUB SERPL-MCNC: 0.2 MG/DL (ref 0.2–1.2)
BILIRUB SERPL-MCNC: 0.3 MG/DL (ref 0.2–1.2)
BILIRUB UR QL STRIP: NEGATIVE
BODY TEMPERATURE: 98.6 C
BUN BLD-MCNC: 27 MG/DL (ref 6–20)
BUN BLD-MCNC: 27 MG/DL (ref 6–20)
BUN BLD-MCNC: 34 MG/DL (ref 6–20)
BUN BLD-MCNC: 36 MG/DL (ref 6–20)
BUN/CREAT SERPL: 15.4 (ref 7–25)
BUN/CREAT SERPL: 16.3 (ref 7–25)
BUN/CREAT SERPL: 21.2 (ref 7–25)
BUN/CREAT SERPL: 23 (ref 7–25)
CALCIUM SPEC-SCNC: 8.9 MG/DL (ref 8.6–10.5)
CALCIUM SPEC-SCNC: 8.9 MG/DL (ref 8.6–10.5)
CALCIUM SPEC-SCNC: 9 MG/DL (ref 8.6–10.5)
CALCIUM SPEC-SCNC: 9.6 MG/DL (ref 8.6–10.5)
CHLORIDE SERPL-SCNC: 105 MMOL/L (ref 98–107)
CHLORIDE SERPL-SCNC: 106 MMOL/L (ref 98–107)
CHLORIDE SERPL-SCNC: 106 MMOL/L (ref 98–107)
CHLORIDE SERPL-SCNC: 107 MMOL/L (ref 98–107)
CHOLEST SERPL-MCNC: 160 MG/DL (ref 0–200)
CHOLEST SERPL-MCNC: 179 MG/DL (ref 0–200)
CK SERPL-CCNC: 306 U/L (ref 20–200)
CK SERPL-CCNC: 332 U/L (ref 20–200)
CK SERPL-CCNC: 834 U/L (ref 20–200)
CLARITY UR: ABNORMAL
CO2 SERPL-SCNC: 19.7 MMOL/L (ref 22–29)
CO2 SERPL-SCNC: 23.3 MMOL/L (ref 22–29)
CO2 SERPL-SCNC: 23.3 MMOL/L (ref 22–29)
CO2 SERPL-SCNC: 23.5 MMOL/L (ref 22–29)
COHGB MFR BLD: 6.1 % (ref 0–5)
COLOR UR: YELLOW
CREAT BLD-MCNC: 1.48 MG/DL (ref 0.76–1.27)
CREAT BLD-MCNC: 1.66 MG/DL (ref 0.76–1.27)
CREAT BLD-MCNC: 1.7 MG/DL (ref 0.76–1.27)
CREAT BLD-MCNC: 1.75 MG/DL (ref 0.76–1.27)
D-LACTATE SERPL-SCNC: 1 MMOL/L (ref 0.5–2)
DEPRECATED RDW RBC AUTO: 45.8 FL (ref 37–54)
DEPRECATED RDW RBC AUTO: 48 FL (ref 37–54)
DEPRECATED RDW RBC AUTO: 49.8 FL (ref 37–54)
DEPRECATED RDW RBC AUTO: 56.8 FL (ref 37–54)
EOSINOPHIL # BLD AUTO: 0.07 10*3/MM3 (ref 0–0.4)
EOSINOPHIL # BLD AUTO: 0.13 10*3/MM3 (ref 0–0.4)
EOSINOPHIL # BLD AUTO: 0.22 10*3/MM3 (ref 0–0.4)
EOSINOPHIL # BLD AUTO: 0.25 10*3/MM3 (ref 0–0.4)
EOSINOPHIL NFR BLD AUTO: 0.8 % (ref 0.3–6.2)
EOSINOPHIL NFR BLD AUTO: 0.9 % (ref 0.3–6.2)
EOSINOPHIL NFR BLD AUTO: 2.2 % (ref 0.3–6.2)
EOSINOPHIL NFR BLD AUTO: 3 % (ref 0.3–6.2)
ERYTHROCYTE [DISTWIDTH] IN BLOOD BY AUTOMATED COUNT: 12.8 % (ref 12.3–15.4)
ERYTHROCYTE [DISTWIDTH] IN BLOOD BY AUTOMATED COUNT: 13.6 % (ref 12.3–15.4)
ERYTHROCYTE [DISTWIDTH] IN BLOOD BY AUTOMATED COUNT: 14 % (ref 12.3–15.4)
ERYTHROCYTE [DISTWIDTH] IN BLOOD BY AUTOMATED COUNT: 15.2 % (ref 12.3–15.4)
GFR SERPL CREATININE-BSD FRML MDRD: 41 ML/MIN/1.73
GFR SERPL CREATININE-BSD FRML MDRD: 42 ML/MIN/1.73
GFR SERPL CREATININE-BSD FRML MDRD: 43 ML/MIN/1.73
GFR SERPL CREATININE-BSD FRML MDRD: 50 ML/MIN/1.73
GLOBULIN UR ELPH-MCNC: 2.6 GM/DL
GLOBULIN UR ELPH-MCNC: 2.7 GM/DL
GLOBULIN UR ELPH-MCNC: 2.9 GM/DL
GLOBULIN UR ELPH-MCNC: 3 GM/DL
GLUCOSE BLD-MCNC: 101 MG/DL (ref 65–99)
GLUCOSE BLD-MCNC: 114 MG/DL (ref 65–99)
GLUCOSE BLD-MCNC: 144 MG/DL (ref 65–99)
GLUCOSE BLD-MCNC: 154 MG/DL (ref 65–99)
GLUCOSE BLDC GLUCOMTR-MCNC: 124 MG/DL (ref 70–130)
GLUCOSE BLDC GLUCOMTR-MCNC: 133 MG/DL (ref 70–130)
GLUCOSE BLDC GLUCOMTR-MCNC: 134 MG/DL (ref 70–130)
GLUCOSE BLDC GLUCOMTR-MCNC: 158 MG/DL (ref 70–130)
GLUCOSE BLDC GLUCOMTR-MCNC: 175 MG/DL (ref 70–130)
GLUCOSE BLDC GLUCOMTR-MCNC: 183 MG/DL (ref 70–130)
GLUCOSE UR STRIP-MCNC: ABNORMAL MG/DL
HBA1C MFR BLD: 5.9 % (ref 4.8–5.6)
HBA1C MFR BLD: 6.13 % (ref 4.8–5.6)
HBA1C MFR BLD: 6.4 % (ref 4.8–5.6)
HCO3 BLDA-SCNC: 18.1 MMOL/L (ref 22–26)
HCT VFR BLD AUTO: 37.3 % (ref 37.5–51)
HCT VFR BLD AUTO: 41.8 % (ref 37.5–51)
HCT VFR BLD AUTO: 45.2 % (ref 37.5–51)
HCT VFR BLD AUTO: 47.2 % (ref 37.5–51)
HCT VFR BLD CALC: 41 % (ref 42–52)
HDLC SERPL-MCNC: 33 MG/DL (ref 40–60)
HDLC SERPL-MCNC: 34 MG/DL (ref 40–60)
HGB BLD-MCNC: 12.5 G/DL (ref 13–17.7)
HGB BLD-MCNC: 13.9 G/DL (ref 13–17.7)
HGB BLD-MCNC: 14.1 G/DL (ref 13–17.7)
HGB BLD-MCNC: 14.4 G/DL (ref 13–17.7)
HGB BLDA-MCNC: 13.8 G/DL (ref 12–16)
HGB UR QL STRIP.AUTO: NEGATIVE
HOLD SPECIMEN: NORMAL
HOLD SPECIMEN: NORMAL
HOROWITZ INDEX BLD+IHG-RTO: 21 %
HYALINE CASTS UR QL AUTO: ABNORMAL /LPF
IMM GRANULOCYTES # BLD AUTO: 0.03 10*3/MM3 (ref 0–0.05)
IMM GRANULOCYTES # BLD AUTO: 0.04 10*3/MM3 (ref 0–0.05)
IMM GRANULOCYTES # BLD AUTO: 0.07 10*3/MM3 (ref 0–0.05)
IMM GRANULOCYTES # BLD AUTO: 0.08 10*3/MM3 (ref 0–0.05)
IMM GRANULOCYTES NFR BLD AUTO: 0.4 % (ref 0–0.5)
IMM GRANULOCYTES NFR BLD AUTO: 0.5 % (ref 0–0.5)
IMM GRANULOCYTES NFR BLD AUTO: 0.5 % (ref 0–0.5)
IMM GRANULOCYTES NFR BLD AUTO: 0.7 % (ref 0–0.5)
KETONES UR QL STRIP: NEGATIVE
LDLC SERPL CALC-MCNC: 106 MG/DL (ref 0–100)
LDLC SERPL CALC-MCNC: 87 MG/DL (ref 0–100)
LDLC/HDLC SERPL: 2.63 {RATIO}
LDLC/HDLC SERPL: 3.12 {RATIO}
LEUKOCYTE ESTERASE UR QL STRIP.AUTO: NEGATIVE
LIPASE SERPL-CCNC: 36 U/L (ref 13–60)
LYMPHOCYTES # BLD AUTO: 0.82 10*3/MM3 (ref 0.7–3.1)
LYMPHOCYTES # BLD AUTO: 1.68 10*3/MM3 (ref 0.7–3.1)
LYMPHOCYTES # BLD AUTO: 1.73 10*3/MM3 (ref 0.7–3.1)
LYMPHOCYTES # BLD AUTO: 1.97 10*3/MM3 (ref 0.7–3.1)
LYMPHOCYTES NFR BLD AUTO: 11.3 % (ref 19.6–45.3)
LYMPHOCYTES NFR BLD AUTO: 19.4 % (ref 19.6–45.3)
LYMPHOCYTES NFR BLD AUTO: 20.9 % (ref 19.6–45.3)
LYMPHOCYTES NFR BLD AUTO: 9.7 % (ref 19.6–45.3)
MAGNESIUM SERPL-MCNC: 2 MG/DL (ref 1.6–2.6)
MCH RBC QN AUTO: 30.8 PG (ref 26.6–33)
MCH RBC QN AUTO: 30.9 PG (ref 26.6–33)
MCH RBC QN AUTO: 31.4 PG (ref 26.6–33)
MCH RBC QN AUTO: 32.7 PG (ref 26.6–33)
MCHC RBC AUTO-ENTMCNC: 30.5 G/DL (ref 31.5–35.7)
MCHC RBC AUTO-ENTMCNC: 31.2 G/DL (ref 31.5–35.7)
MCHC RBC AUTO-ENTMCNC: 33.3 G/DL (ref 31.5–35.7)
MCHC RBC AUTO-ENTMCNC: 33.5 G/DL (ref 31.5–35.7)
MCV RBC AUTO: 101.1 FL (ref 79–97)
MCV RBC AUTO: 94.4 FL (ref 79–97)
MCV RBC AUTO: 97.6 FL (ref 79–97)
MCV RBC AUTO: 99.1 FL (ref 79–97)
METHGB BLD QL: 0.1 % (ref 0–3)
MODALITY: ABNORMAL
MONOCYTES # BLD AUTO: 0.54 10*3/MM3 (ref 0.1–0.9)
MONOCYTES # BLD AUTO: 0.86 10*3/MM3 (ref 0.1–0.9)
MONOCYTES # BLD AUTO: 1.04 10*3/MM3 (ref 0.1–0.9)
MONOCYTES # BLD AUTO: 1.13 10*3/MM3 (ref 0.1–0.9)
MONOCYTES NFR BLD AUTO: 10.4 % (ref 5–12)
MONOCYTES NFR BLD AUTO: 11.1 % (ref 5–12)
MONOCYTES NFR BLD AUTO: 6.4 % (ref 5–12)
MONOCYTES NFR BLD AUTO: 7 % (ref 5–12)
NEUTROPHILS # BLD AUTO: 11.82 10*3/MM3 (ref 1.4–7)
NEUTROPHILS # BLD AUTO: 5.35 10*3/MM3 (ref 1.7–7)
NEUTROPHILS # BLD AUTO: 6.73 10*3/MM3 (ref 1.7–7)
NEUTROPHILS # BLD AUTO: 6.99 10*3/MM3 (ref 1.7–7)
NEUTROPHILS NFR BLD AUTO: 64.6 % (ref 42.7–76)
NEUTROPHILS NFR BLD AUTO: 66.1 % (ref 42.7–76)
NEUTROPHILS NFR BLD AUTO: 79.8 % (ref 42.7–76)
NEUTROPHILS NFR BLD AUTO: 82.4 % (ref 42.7–76)
NITRITE UR QL STRIP: NEGATIVE
NRBC BLD AUTO-RTO: 0 /100 WBC (ref 0–0)
NRBC BLD AUTO-RTO: 0 /100 WBC (ref 0–0.2)
NRBC BLD AUTO-RTO: 0 /100 WBC (ref 0–0.2)
OXYHGB MFR BLDV: 90.2 % (ref 85–100)
PCO2 BLDA: 31 MM HG (ref 35–45)
PH BLDA: 7.38 PH UNITS (ref 7.35–7.45)
PH UR STRIP.AUTO: <=5 [PH] (ref 5–8)
PLATELET # BLD AUTO: 267 10*3/MM3 (ref 140–450)
PLATELET # BLD AUTO: 307 10*3/MM3 (ref 140–450)
PLATELET # BLD AUTO: 310 10*3/MM3 (ref 140–450)
PLATELET # BLD AUTO: 310 10*3/MM3 (ref 140–450)
PMV BLD AUTO: 11.2 FL (ref 6–12)
PMV BLD AUTO: 11.3 FL (ref 6–12)
PMV BLD AUTO: 11.6 FL (ref 6–12)
PMV BLD AUTO: 12.2 FL (ref 6–12)
PO2 BLDA: 85.7 MM HG (ref 80–100)
POTASSIUM BLD-SCNC: 3.9 MMOL/L (ref 3.5–5.2)
POTASSIUM BLD-SCNC: 4.8 MMOL/L (ref 3.5–5.2)
POTASSIUM BLD-SCNC: 4.8 MMOL/L (ref 3.5–5.2)
POTASSIUM BLD-SCNC: 5.2 MMOL/L (ref 3.5–5.2)
PROT SERPL-MCNC: 6 G/DL (ref 6–8.5)
PROT SERPL-MCNC: 6.4 G/DL (ref 6–8.5)
PROT SERPL-MCNC: 6.5 G/DL (ref 6–8.5)
PROT SERPL-MCNC: 6.6 G/DL (ref 6–8.5)
PROT UR QL STRIP: ABNORMAL
RBC # BLD AUTO: 3.82 10*6/MM3 (ref 4.14–5.8)
RBC # BLD AUTO: 4.43 10*6/MM3 (ref 4.14–5.8)
RBC # BLD AUTO: 4.56 10*6/MM3 (ref 4.14–5.8)
RBC # BLD AUTO: 4.67 10*6/MM3 (ref 4.14–5.8)
RBC # UR: ABNORMAL /HPF
REF LAB TEST METHOD: ABNORMAL
SAO2 % BLDCOA: 96.2 % (ref 90–100)
SODIUM BLD-SCNC: 138 MMOL/L (ref 136–145)
SODIUM BLD-SCNC: 140 MMOL/L (ref 136–145)
SODIUM BLD-SCNC: 141 MMOL/L (ref 136–145)
SODIUM BLD-SCNC: 142 MMOL/L (ref 136–145)
SP GR UR STRIP: 1.02 (ref 1–1.03)
SQUAMOUS #/AREA URNS HPF: ABNORMAL /HPF
TESTOST FREE SERPL-MCNC: 7.9 PG/ML (ref 7.2–24)
TESTOST SERPL-MCNC: 410 NG/DL (ref 264–916)
TRIGL SERPL-MCNC: 195 MG/DL (ref 0–150)
TRIGL SERPL-MCNC: 201 MG/DL (ref 0–150)
TROPONIN T SERPL-MCNC: <0.01 NG/ML (ref 0–0.03)
TSH SERPL DL<=0.05 MIU/L-ACNC: 2.15 MIU/ML (ref 0.27–4.2)
TSH SERPL DL<=0.05 MIU/L-ACNC: 2.47 MIU/ML (ref 0.27–4.2)
UROBILINOGEN UR QL STRIP: ABNORMAL
VLDLC SERPL-MCNC: 39 MG/DL (ref 5–40)
VLDLC SERPL-MCNC: 40.2 MG/DL (ref 5–40)
WBC NRBC COR # BLD: 10.17 10*3/MM3 (ref 3.4–10.8)
WBC NRBC COR # BLD: 14.82 10*3/MM3 (ref 3.4–10.8)
WBC NRBC COR # BLD: 8.28 10*3/MM3 (ref 3.4–10.8)
WBC NRBC COR # BLD: 8.48 10*3/MM3 (ref 3.4–10.8)
WBC UR QL AUTO: ABNORMAL /HPF
WHOLE BLOOD HOLD SPECIMEN: NORMAL
WHOLE BLOOD HOLD SPECIMEN: NORMAL

## 2019-01-01 PROCEDURE — 83050 HGB METHEMOGLOBIN QUAN: CPT | Performed by: EMERGENCY MEDICINE

## 2019-01-01 PROCEDURE — 80053 COMPREHEN METABOLIC PANEL: CPT

## 2019-01-01 PROCEDURE — 76775 US EXAM ABDO BACK WALL LIM: CPT

## 2019-01-01 PROCEDURE — 36415 COLL VENOUS BLD VENIPUNCTURE: CPT

## 2019-01-01 PROCEDURE — 82550 ASSAY OF CK (CPK): CPT

## 2019-01-01 PROCEDURE — 99214 OFFICE O/P EST MOD 30 MIN: CPT | Performed by: INTERNAL MEDICINE

## 2019-01-01 PROCEDURE — 90732 PPSV23 VACC 2 YRS+ SUBQ/IM: CPT | Performed by: INTERNAL MEDICINE

## 2019-01-01 PROCEDURE — 84403 ASSAY OF TOTAL TESTOSTERONE: CPT

## 2019-01-01 PROCEDURE — 90674 CCIIV4 VAC NO PRSV 0.5 ML IM: CPT | Performed by: INTERNAL MEDICINE

## 2019-01-01 PROCEDURE — 83735 ASSAY OF MAGNESIUM: CPT | Performed by: EMERGENCY MEDICINE

## 2019-01-01 PROCEDURE — 83036 HEMOGLOBIN GLYCOSYLATED A1C: CPT

## 2019-01-01 PROCEDURE — 93010 ELECTROCARDIOGRAM REPORT: CPT | Performed by: INTERNAL MEDICINE

## 2019-01-01 PROCEDURE — 90471 IMMUNIZATION ADMIN: CPT | Performed by: INTERNAL MEDICINE

## 2019-01-01 PROCEDURE — 80050 GENERAL HEALTH PANEL: CPT | Performed by: EMERGENCY MEDICINE

## 2019-01-01 PROCEDURE — 80050 GENERAL HEALTH PANEL: CPT

## 2019-01-01 PROCEDURE — 82043 UR ALBUMIN QUANTITATIVE: CPT

## 2019-01-01 PROCEDURE — 76775 US EXAM ABDO BACK WALL LIM: CPT | Performed by: RADIOLOGY

## 2019-01-01 PROCEDURE — 84402 ASSAY OF FREE TESTOSTERONE: CPT

## 2019-01-01 PROCEDURE — 82375 ASSAY CARBOXYHB QUANT: CPT | Performed by: EMERGENCY MEDICINE

## 2019-01-01 PROCEDURE — 93005 ELECTROCARDIOGRAM TRACING: CPT | Performed by: EMERGENCY MEDICINE

## 2019-01-01 PROCEDURE — 85025 COMPLETE CBC W/AUTO DIFF WBC: CPT

## 2019-01-01 PROCEDURE — 82962 GLUCOSE BLOOD TEST: CPT

## 2019-01-01 PROCEDURE — 83690 ASSAY OF LIPASE: CPT | Performed by: EMERGENCY MEDICINE

## 2019-01-01 PROCEDURE — 82805 BLOOD GASES W/O2 SATURATION: CPT | Performed by: EMERGENCY MEDICINE

## 2019-01-01 PROCEDURE — 99285 EMERGENCY DEPT VISIT HI MDM: CPT

## 2019-01-01 PROCEDURE — 84484 ASSAY OF TROPONIN QUANT: CPT | Performed by: EMERGENCY MEDICINE

## 2019-01-01 PROCEDURE — 80061 LIPID PANEL: CPT

## 2019-01-01 PROCEDURE — 70450 CT HEAD/BRAIN W/O DYE: CPT

## 2019-01-01 PROCEDURE — 36600 WITHDRAWAL OF ARTERIAL BLOOD: CPT | Performed by: EMERGENCY MEDICINE

## 2019-01-01 PROCEDURE — 83605 ASSAY OF LACTIC ACID: CPT | Performed by: EMERGENCY MEDICINE

## 2019-01-01 PROCEDURE — 70450 CT HEAD/BRAIN W/O DYE: CPT | Performed by: RADIOLOGY

## 2019-01-01 PROCEDURE — 83036 HEMOGLOBIN GLYCOSYLATED A1C: CPT | Performed by: EMERGENCY MEDICINE

## 2019-01-01 PROCEDURE — 71045 X-RAY EXAM CHEST 1 VIEW: CPT | Performed by: RADIOLOGY

## 2019-01-01 PROCEDURE — 81001 URINALYSIS AUTO W/SCOPE: CPT | Performed by: EMERGENCY MEDICINE

## 2019-01-01 PROCEDURE — 99213 OFFICE O/P EST LOW 20 MIN: CPT | Performed by: INTERNAL MEDICINE

## 2019-01-01 PROCEDURE — 71045 X-RAY EXAM CHEST 1 VIEW: CPT

## 2019-01-01 RX ORDER — LABETALOL 100 MG/1
TABLET, FILM COATED ORAL
Qty: 30 TABLET | Refills: 0 | Status: SHIPPED | OUTPATIENT
Start: 2019-01-01 | End: 2019-01-01 | Stop reason: SDUPTHER

## 2019-01-01 RX ORDER — AMLODIPINE BESYLATE 5 MG/1
TABLET ORAL
Qty: 90 TABLET | Refills: 0 | Status: SHIPPED | OUTPATIENT
Start: 2019-01-01 | End: 2019-01-01 | Stop reason: SDUPTHER

## 2019-01-01 RX ORDER — VALSARTAN 160 MG/1
TABLET ORAL
Qty: 30 TABLET | Refills: 0 | Status: SHIPPED | OUTPATIENT
Start: 2019-01-01 | End: 2019-01-01

## 2019-01-01 RX ORDER — AMLODIPINE BESYLATE 5 MG/1
TABLET ORAL
Qty: 90 TABLET | Refills: 0 | Status: SHIPPED | OUTPATIENT
Start: 2019-01-01

## 2019-01-01 RX ORDER — VALSARTAN 160 MG/1
TABLET ORAL
Qty: 30 TABLET | Refills: 0 | Status: SHIPPED | OUTPATIENT
Start: 2019-01-01 | End: 2019-01-01 | Stop reason: SDUPTHER

## 2019-01-01 RX ORDER — HYDRALAZINE HYDROCHLORIDE 25 MG/1
TABLET, FILM COATED ORAL
Qty: 180 TABLET | Refills: 0 | Status: SHIPPED | OUTPATIENT
Start: 2019-01-01 | End: 2019-01-01 | Stop reason: SDUPTHER

## 2019-01-01 RX ORDER — PEN NEEDLE, DIABETIC 32GX 5/32"
NEEDLE, DISPOSABLE MISCELLANEOUS
Qty: 200 EACH | Refills: 0 | Status: SHIPPED | OUTPATIENT
Start: 2019-01-01 | End: 2019-01-01 | Stop reason: SDUPTHER

## 2019-01-01 RX ORDER — VENLAFAXINE 75 MG/1
75 TABLET ORAL 2 TIMES DAILY
COMMUNITY

## 2019-01-01 RX ORDER — BACLOFEN 20 MG/1
20 TABLET ORAL 3 TIMES DAILY
COMMUNITY

## 2019-01-01 RX ORDER — DEXTROSE AND SODIUM CHLORIDE 5; .9 G/100ML; G/100ML
125 INJECTION, SOLUTION INTRAVENOUS CONTINUOUS
Status: DISCONTINUED | OUTPATIENT
Start: 2019-01-01 | End: 2019-01-01 | Stop reason: HOSPADM

## 2019-01-01 RX ORDER — PEN NEEDLE, DIABETIC 32GX 5/32"
NEEDLE, DISPOSABLE MISCELLANEOUS
Qty: 4 EACH | Refills: 0 | Status: SHIPPED | OUTPATIENT
Start: 2019-01-01

## 2019-01-01 RX ORDER — PEN NEEDLE, DIABETIC 32GX 5/32"
NEEDLE, DISPOSABLE MISCELLANEOUS
Qty: 120 EACH | Refills: 1 | Status: SHIPPED | OUTPATIENT
Start: 2019-01-01 | End: 2019-01-01 | Stop reason: SDUPTHER

## 2019-01-01 RX ORDER — CEFDINIR 300 MG/1
300 CAPSULE ORAL 2 TIMES DAILY
Qty: 20 CAPSULE | Refills: 0 | Status: SHIPPED | OUTPATIENT
Start: 2019-01-01 | End: 2019-01-01

## 2019-01-01 RX ORDER — LABETALOL 100 MG/1
TABLET, FILM COATED ORAL
Qty: 30 TABLET | Refills: 1 | Status: SHIPPED | OUTPATIENT
Start: 2019-01-01 | End: 2019-01-01 | Stop reason: SDUPTHER

## 2019-01-01 RX ORDER — VALSARTAN 160 MG/1
TABLET ORAL
Qty: 30 TABLET | Refills: 0 | OUTPATIENT
Start: 2019-01-01

## 2019-01-01 RX ORDER — INSULIN GLARGINE 100 [IU]/ML
INJECTION, SOLUTION SUBCUTANEOUS
Qty: 3 ML | Refills: 12 | Status: SHIPPED | OUTPATIENT
Start: 2019-01-01

## 2019-01-01 RX ORDER — HYDRALAZINE HYDROCHLORIDE 25 MG/1
25 TABLET, FILM COATED ORAL 4 TIMES DAILY
Qty: 360 TABLET | Refills: 2 | Status: SHIPPED | OUTPATIENT
Start: 2019-01-01

## 2019-01-01 RX ORDER — VALSARTAN 160 MG/1
160 TABLET ORAL DAILY
Qty: 30 TABLET | Refills: 0 | Status: SHIPPED | OUTPATIENT
Start: 2019-01-01 | End: 2019-01-01 | Stop reason: SDUPTHER

## 2019-01-01 RX ORDER — ACETAMINOPHEN AND CODEINE PHOSPHATE 300; 30 MG/1; MG/1
1 TABLET ORAL EVERY 4 HOURS PRN
COMMUNITY

## 2019-01-01 RX ORDER — LABETALOL 100 MG/1
100 TABLET, FILM COATED ORAL 2 TIMES DAILY
Qty: 90 TABLET | Refills: 2 | Status: SHIPPED | OUTPATIENT
Start: 2019-01-01

## 2019-01-01 RX ORDER — PEN NEEDLE, DIABETIC 32GX 5/32"
NEEDLE, DISPOSABLE MISCELLANEOUS
Qty: 100 EACH | Refills: 0 | Status: SHIPPED | OUTPATIENT
Start: 2019-01-01 | End: 2019-01-01 | Stop reason: SDUPTHER

## 2019-01-01 RX ORDER — INSULIN LISPRO 100 U/ML
INJECTION, SOLUTION SUBCUTANEOUS
Refills: 0 | OUTPATIENT
Start: 2019-01-01

## 2019-01-01 RX ADMIN — DEXTROSE AND SODIUM CHLORIDE 125 ML/HR: 5; 900 INJECTION, SOLUTION INTRAVENOUS at 11:32

## 2019-01-08 ENCOUNTER — OFFICE VISIT (OUTPATIENT)
Dept: CARDIOLOGY | Facility: CLINIC | Age: 54
End: 2019-01-08

## 2019-01-08 VITALS
SYSTOLIC BLOOD PRESSURE: 142 MMHG | WEIGHT: 202 LBS | OXYGEN SATURATION: 92 % | HEART RATE: 89 BPM | DIASTOLIC BLOOD PRESSURE: 70 MMHG | HEIGHT: 68 IN | BODY MASS INDEX: 30.62 KG/M2

## 2019-01-08 DIAGNOSIS — E11.22 TYPE 2 DIABETES MELLITUS WITH STAGE 3 CHRONIC KIDNEY DISEASE, WITH LONG-TERM CURRENT USE OF INSULIN (HCC): ICD-10-CM

## 2019-01-08 DIAGNOSIS — E66.9 CLASS 1 OBESITY WITHOUT SERIOUS COMORBIDITY WITH BODY MASS INDEX (BMI) OF 30.0 TO 30.9 IN ADULT, UNSPECIFIED OBESITY TYPE: ICD-10-CM

## 2019-01-08 DIAGNOSIS — Z79.4 TYPE 2 DIABETES MELLITUS WITH STAGE 3 CHRONIC KIDNEY DISEASE, WITH LONG-TERM CURRENT USE OF INSULIN (HCC): ICD-10-CM

## 2019-01-08 DIAGNOSIS — Z72.0 TOBACCO USE: ICD-10-CM

## 2019-01-08 DIAGNOSIS — N18.30 TYPE 2 DIABETES MELLITUS WITH STAGE 3 CHRONIC KIDNEY DISEASE, WITH LONG-TERM CURRENT USE OF INSULIN (HCC): ICD-10-CM

## 2019-01-08 DIAGNOSIS — N18.2 STAGE 2 CHRONIC KIDNEY DISEASE: ICD-10-CM

## 2019-01-08 DIAGNOSIS — E78.1 HYPERLIPIDEMIA TYPE IV: ICD-10-CM

## 2019-01-08 DIAGNOSIS — I10 ESSENTIAL HYPERTENSION: Primary | ICD-10-CM

## 2019-01-08 PROCEDURE — 99214 OFFICE O/P EST MOD 30 MIN: CPT | Performed by: INTERNAL MEDICINE

## 2019-01-08 NOTE — PROGRESS NOTES
subjective     Chief Complaint   Patient presents with   • Hyperlipidemia   • Follow-up     History of Present Illness  Patient is 53 years old white male who is here for follow-up of multiple chronic medical problems.  Patient has essential hypertension.  He is taking Norvasc, hydralazine and  lisinopril.  Blood pressure is very well controlled.  7 round 1:30 to 140 systolic.    Patient also has diabetes mellitus type 2 insulin-dependent.  He is taking basaglar or 45 units in the morning and Humalog 10-12 units 3 times a day.  Blood sugar is running around 120s in the morning, 108 known and 140 or so at night.  Patient is overweight and has not lost much weight.  He is trying to diet.    He also has type IV hyperlipidemia  Ongoing tobacco use.  Patient has peripheral neuritis is seeing Dr. salmon and is taking Neurontin .  He is considering spinal cord stimulation.    Patient also has diabetic nephropathy with the chronic renal failure stage II.  He was advised to drink more fluids and avoid NSAID therapy.    Past Surgical History:   Procedure Laterality Date   • CARDIOVASCULAR STRESS TEST  04/2015   • ECHO - CONVERTED  10/2014   • TEETH EXTRACTION       Family History   Problem Relation Age of Onset   • Cancer Sister    • Diabetes Sister      Past Medical History:   Diagnosis Date   • Diabetes mellitus (CMS/HCC)    • Hyperlipidemia    • Hypertension    • Leg cramps    • Obesity    • Peripheral neuritis    • Tobacco use      Patient Active Problem List   Diagnosis   • Leg cramps*   • Peripheral neuritis*   • Obesity*   • Tobacco use*   • Hyperlipidemia type IV*   • Hypertension*   • Type 2 diabetes mellitus with chronic kidney disease, with long-term current use of insulin (CMS/HCC)   • Chronic fatigue   • Elevated CPK   • Chronic kidney disease   • Mass, right forehead       Social History     Tobacco Use   • Smoking status: Current Every Day Smoker     Packs/day: 1.50     Years: 35.00     Pack years: 52.50      Types: Cigarettes   • Smokeless tobacco: Never Used   Substance Use Topics   • Alcohol use: No   • Drug use: No       No Known Allergies    Current Outpatient Medications on File Prior to Visit   Medication Sig   • amLODIPine (NORVASC) 5 MG tablet TAKE 1 TABLET BY MOUTH DAILY   • BD PEN NEEDLE NOLBERTO U/F 32G X 4 MM misc USE FOUR TIMES DAILY AS DIRECTED   • Blood Glucose Monitoring Suppl (ONE TOUCH ULTRA 2) W/DEVICE kit 1 kit 3 (Three) Times a Day. E11.9   • gabapentin (NEURONTIN) 300 MG capsule Take 800 mg by mouth 3 (Three) Times a Day.   • hydrALAZINE (APRESOLINE) 25 MG tablet TAKE 1 TABLET BY MOUTH THREE TIMES DAILY   • insulin aspart (novoLOG FLEXPEN) 100 UNIT/ML solution pen-injector sc pen Inject 10 Units under the skin into the appropriate area as directed 3 (Three) Times a Day With Meals.   • Insulin Glargine (BASAGLAR KWIKPEN) 100 UNIT/ML injection pen INJECT 45 UNITS UNDER THE SKIN EVERY DAY   • Lancets (ONETOUCH ULTRASOFT) lancets Pt needs to check blood sugar 3 times a day E11.9   • lisinopril (PRINIVIL,ZESTRIL) 20 MG tablet Take 1 tablet by mouth Daily.   • ONE TOUCH ULTRA TEST test strip USE TO TEST BLOOD SUGAR THREE TIMES DAILY AS DIRECTED     No current facility-administered medications on file prior to visit.          The following portions of the patient's history were reviewed and updated as appropriate: allergies, current medications, past family history, past medical history, past social history, past surgical history and problem list.    Review of Systems   Constitution: Negative.   HENT: Negative.  Negative for congestion.    Eyes: Negative.    Cardiovascular: Negative.  Negative for chest pain, cyanosis, dyspnea on exertion, irregular heartbeat, leg swelling, near-syncope, orthopnea, palpitations, paroxysmal nocturnal dyspnea and syncope.   Respiratory: Negative.  Negative for shortness of breath.    Hematologic/Lymphatic: Negative.    Musculoskeletal: Positive for arthritis and myalgias.  "  Gastrointestinal: Negative.    Neurological: Positive for numbness and paresthesias. Negative for headaches.          Objective:     /70 (BP Location: Left arm, Patient Position: Sitting)   Pulse 89   Ht 172.7 cm (68\")   Wt 91.6 kg (202 lb)   SpO2 92%   BMI 30.71 kg/m²   Physical Exam   Constitutional: He appears well-developed and well-nourished. No distress.   HENT:   Head: Normocephalic and atraumatic.   Mouth/Throat: Oropharynx is clear and moist. No oropharyngeal exudate.   Eyes: Conjunctivae and EOM are normal. Pupils are equal, round, and reactive to light. No scleral icterus.   Neck: Normal range of motion. Neck supple. No JVD present. No tracheal deviation present. No thyromegaly present.   Cardiovascular: Normal rate, regular rhythm, normal heart sounds and intact distal pulses. PMI is not displaced. Exam reveals no gallop, no friction rub and no decreased pulses.   No murmur heard.  Pulses:       Carotid pulses are 3+ on the right side, and 3+ on the left side.       Radial pulses are 3+ on the right side, and 3+ on the left side.   Pulmonary/Chest: Effort normal and breath sounds normal. No respiratory distress. He has no wheezes. He has no rales. He exhibits no tenderness.   Abdominal: Soft. Bowel sounds are normal. He exhibits no distension, no abdominal bruit and no mass. There is no splenomegaly or hepatomegaly. There is no tenderness. There is no rebound and no guarding.   Musculoskeletal: Normal range of motion. He exhibits no edema, tenderness or deformity.   Lymphadenopathy:     He has no cervical adenopathy.   Neurological: He is alert. He has normal reflexes. No cranial nerve deficit. He exhibits normal muscle tone. Coordination normal.   Skin: Skin is warm and dry. No rash noted. He is not diaphoretic. No erythema.   Psychiatric: He has a normal mood and affect. His behavior is normal. Judgment and thought content normal.         Lab Review  Lab Results   Component Value Date    "  05/16/2018    K 4.3 05/16/2018     05/16/2018    BUN 21 05/16/2018    CREATININE 1.56 (H) 05/16/2018    GLUCOSE 132 (H) 05/16/2018    CALCIUM 9.1 05/16/2018    ALT 23 05/16/2018    ALKPHOS 72 05/16/2018    LABIL2 1.5 01/29/2016     Lab Results   Component Value Date    CKTOTAL 235 (H) 09/28/2018     Lab Results   Component Value Date    WBC 11.24 05/16/2018    HGB 15.0 05/16/2018    HCT 44.7 05/16/2018     05/16/2018     No results found for: INR  No results found for: MG  Lab Results   Component Value Date    PSA 0.390 05/16/2018    TSH 2.812 12/06/2016     No results found for: BNP  Lab Results   Component Value Date    CHLPL 128 01/29/2016    CHOL 180 05/16/2018    TRIG 328 (H) 05/16/2018    HDL 31 (L) 05/16/2018    VLDL 65.6 05/16/2018    LDLHDL 2.69 05/16/2018     Lab Results   Component Value Date    LDL 83 05/16/2018    LDL 50 11/06/2017       Procedures       I personally viewed and interpreted the patient's LAB data         Assessment:     1. Essential hypertension    2. Hyperlipidemia type IV*    3. Type 2 diabetes mellitus with stage 3 chronic kidney disease, with long-term current use of insulin (CMS/[facility])    4. Tobacco use*    5. Class 1 obesity without serious comorbidity with body mass index (BMI) of 30.0 to 30.9 in adult, unspecified obesity type    6. Stage 2 chronic kidney disease          Plan:      Blood pressure is very well controlled.  Patient will continue on Norvasc, hydralazine and lisinopril.  He will check his blood pressure 2-3 times daily.    Blood sugar is also not adequately controlled.  He was advised to take long-acting insulin 45 units daily.  He will continue sliding scale coverage which was adjusted.  Cessation of tobacco use was strongly urged.  Exercise program and weight loss was emphasized  Because of renal failure patient was advised to drink more fluids and avoid NSAID therapy  Lab work scheduled for next visit.      Return in about 3 months (around  4/8/2019).

## 2019-01-28 RX ORDER — AMLODIPINE BESYLATE 5 MG/1
TABLET ORAL
Qty: 90 TABLET | Refills: 0 | Status: SHIPPED | OUTPATIENT
Start: 2019-01-28 | End: 2019-02-18 | Stop reason: ALTCHOICE

## 2019-02-18 ENCOUNTER — OFFICE VISIT (OUTPATIENT)
Dept: CARDIOLOGY | Facility: CLINIC | Age: 54
End: 2019-02-18

## 2019-02-18 VITALS
HEIGHT: 68 IN | BODY MASS INDEX: 29.7 KG/M2 | OXYGEN SATURATION: 97 % | HEART RATE: 82 BPM | DIASTOLIC BLOOD PRESSURE: 80 MMHG | SYSTOLIC BLOOD PRESSURE: 138 MMHG | WEIGHT: 196 LBS

## 2019-02-18 DIAGNOSIS — R53.82 CHRONIC FATIGUE: ICD-10-CM

## 2019-02-18 DIAGNOSIS — N18.30 TYPE 2 DIABETES MELLITUS WITH STAGE 3 CHRONIC KIDNEY DISEASE, WITH LONG-TERM CURRENT USE OF INSULIN (HCC): ICD-10-CM

## 2019-02-18 DIAGNOSIS — N52.9 ERECTILE DYSFUNCTION, UNSPECIFIED ERECTILE DYSFUNCTION TYPE: ICD-10-CM

## 2019-02-18 DIAGNOSIS — Z79.4 TYPE 2 DIABETES MELLITUS WITH STAGE 3 CHRONIC KIDNEY DISEASE, WITH LONG-TERM CURRENT USE OF INSULIN (HCC): ICD-10-CM

## 2019-02-18 DIAGNOSIS — E11.22 TYPE 2 DIABETES MELLITUS WITH STAGE 3 CHRONIC KIDNEY DISEASE, WITH LONG-TERM CURRENT USE OF INSULIN (HCC): ICD-10-CM

## 2019-02-18 DIAGNOSIS — I10 ESSENTIAL HYPERTENSION: ICD-10-CM

## 2019-02-18 DIAGNOSIS — E78.1 HYPERLIPIDEMIA TYPE IV: ICD-10-CM

## 2019-02-18 DIAGNOSIS — R05.3 CHRONIC COUGH: Primary | ICD-10-CM

## 2019-02-18 DIAGNOSIS — Z72.0 TOBACCO USE: ICD-10-CM

## 2019-02-18 DIAGNOSIS — Z12.11 COLON CANCER SCREENING: ICD-10-CM

## 2019-02-18 PROCEDURE — 99214 OFFICE O/P EST MOD 30 MIN: CPT | Performed by: INTERNAL MEDICINE

## 2019-02-18 RX ORDER — AMLODIPINE BESYLATE 10 MG/1
10 TABLET ORAL DAILY
COMMUNITY
End: 2019-01-01 | Stop reason: ALTCHOICE

## 2019-02-18 RX ORDER — VALSARTAN 160 MG/1
160 TABLET ORAL 2 TIMES DAILY
Qty: 60 TABLET | Refills: 3 | Status: SHIPPED | OUTPATIENT
Start: 2019-02-18 | End: 2019-01-01

## 2019-02-18 NOTE — PROGRESS NOTES
subjective     Chief Complaint   Patient presents with   • Cough   • Hypertension     History of Present Illness  Patient is 53 years old white male who is taking lisinopril for hypertension.  He states that he is having lot of dry hacking cough.  No fever or chills this has been going on for a few weeks but is getting worse recently.  Patient thinks that he may have upper respiratory tract infection    Blood pressure otherwise is controlled.  He is also taking Norvasc, hydralazine and lisinopril as mentioned.    Patient has hyperlipidemia secondary to obesity and diabetes mellitus.  He is on insulin.  He also complains of being fatigued and tired.  He complains of erectile dysfunction which is getting worse.  Patient has not lost any weight.  He continues to smoke      Past Surgical History:   Procedure Laterality Date   • CARDIOVASCULAR STRESS TEST  04/2015   • ECHO - CONVERTED  10/2014   • TEETH EXTRACTION       Family History   Problem Relation Age of Onset   • Cancer Sister    • Diabetes Sister      Past Medical History:   Diagnosis Date   • Diabetes mellitus (CMS/[facility])    • Hyperlipidemia    • Hypertension    • Leg cramps    • Obesity    • Peripheral neuritis    • Tobacco use      Patient Active Problem List   Diagnosis   • Leg cramps*   • Peripheral neuritis*   • Obesity*   • Tobacco use*   • Hyperlipidemia type IV*   • Hypertension*   • Type 2 diabetes mellitus with chronic kidney disease, with long-term current use of insulin (CMS/[facility])   • Chronic fatigue   • Elevated CPK   • Chronic kidney disease   • Mass, right forehead   • Chronic cough       Social History     Tobacco Use   • Smoking status: Current Every Day Smoker     Packs/day: 1.50     Years: 35.00     Pack years: 52.50     Types: Cigarettes   • Smokeless tobacco: Never Used   Substance Use Topics   • Alcohol use: No   • Drug use: No       No Known Allergies    Current Outpatient Medications on File Prior to Visit   Medication Sig   • amLODIPine  "(NORVASC) 10 MG tablet Take 10 mg by mouth Daily.   • BD PEN NEEDLE NOLBERTO U/F 32G X 4 MM misc USE FOUR TIMES DAILY AS DIRECTED   • Blood Glucose Monitoring Suppl (ONE TOUCH ULTRA 2) W/DEVICE kit 1 kit 3 (Three) Times a Day. E11.9   • gabapentin (NEURONTIN) 300 MG capsule Take 800 mg by mouth 3 (Three) Times a Day.   • hydrALAZINE (APRESOLINE) 25 MG tablet TAKE 1 TABLET BY MOUTH THREE TIMES DAILY   • insulin aspart (novoLOG FLEXPEN) 100 UNIT/ML solution pen-injector sc pen Inject 10 Units under the skin into the appropriate area as directed 3 (Three) Times a Day With Meals.   • Insulin Glargine (BASAGLAR KWIKPEN) 100 UNIT/ML injection pen INJECT 45 UNITS UNDER THE SKIN EVERY DAY   • Lancets (ONETOUCH ULTRASOFT) lancets Pt needs to check blood sugar 3 times a day E11.9   • ONE TOUCH ULTRA TEST test strip USE TO TEST BLOOD SUGAR THREE TIMES DAILY     No current facility-administered medications on file prior to visit.          The following portions of the patient's history were reviewed and updated as appropriate: allergies, current medications, past family history, past medical history, past social history, past surgical history and problem list.    Review of Systems   Constitution: Positive for weakness and malaise/fatigue. Negative for chills and fever.   Respiratory: Positive for cough.    Genitourinary:        Erectile dysfunction          Objective:     /80 (BP Location: Left arm, Patient Position: Sitting)   Pulse 82   Ht 172.7 cm (68\")   Wt 88.9 kg (196 lb)   SpO2 97%   BMI 29.80 kg/m²   Physical Exam   Constitutional: He appears well-developed and well-nourished. No distress.   Obesity   HENT:   Head: Normocephalic and atraumatic.   Mouth/Throat: Oropharynx is clear and moist. No oropharyngeal exudate.   Eyes: Conjunctivae and EOM are normal. Pupils are equal, round, and reactive to light. No scleral icterus.   Neck: Normal range of motion. Neck supple. No JVD present. No tracheal deviation present. " No thyromegaly present.   Cardiovascular: Normal rate, regular rhythm, normal heart sounds and intact distal pulses. PMI is not displaced. Exam reveals no gallop, no friction rub and no decreased pulses.   No murmur heard.  Pulses:       Carotid pulses are 3+ on the right side, and 3+ on the left side.       Radial pulses are 3+ on the right side, and 3+ on the left side.   Pulmonary/Chest: Effort normal and breath sounds normal. No respiratory distress. He has no wheezes. He has no rales. He exhibits no tenderness.   Abdominal: Soft. Bowel sounds are normal. He exhibits no distension, no abdominal bruit and no mass. There is no splenomegaly or hepatomegaly. There is no tenderness. There is no rebound and no guarding.   Musculoskeletal: Normal range of motion. He exhibits no edema, tenderness or deformity.   Lymphadenopathy:     He has no cervical adenopathy.   Neurological: He is alert. He has normal reflexes. No cranial nerve deficit. He exhibits normal muscle tone. Coordination normal.   Skin: Skin is warm and dry. No rash noted. He is not diaphoretic. No erythema.   Psychiatric: He has a normal mood and affect. His behavior is normal. Judgment and thought content normal.         Lab Review  Lab Results   Component Value Date     05/16/2018    K 4.3 05/16/2018     05/16/2018    BUN 21 05/16/2018    CREATININE 1.56 (H) 05/16/2018    GLUCOSE 132 (H) 05/16/2018    CALCIUM 9.1 05/16/2018    ALT 23 05/16/2018    ALKPHOS 72 05/16/2018    LABIL2 1.5 01/29/2016     Lab Results   Component Value Date    CKTOTAL 235 (H) 09/28/2018     Lab Results   Component Value Date    WBC 11.24 05/16/2018    HGB 15.0 05/16/2018    HCT 44.7 05/16/2018     05/16/2018     No results found for: INR  No results found for: MG  Lab Results   Component Value Date    PSA 0.390 05/16/2018    TSH 2.812 12/06/2016     No results found for: BNP  Lab Results   Component Value Date    CHLPL 128 01/29/2016    CHOL 180 05/16/2018     TRIG 328 (H) 05/16/2018    HDL 31 (L) 05/16/2018    VLDL 65.6 05/16/2018    LDLHDL 2.69 05/16/2018     Lab Results   Component Value Date    LDL 83 05/16/2018    LDL 50 11/06/2017       Procedures       I personally viewed and interpreted the patient's LAB data         Assessment:     1. Chronic cough    2. Colon cancer screening    3. Hyperlipidemia type IV*    4. Essential hypertension    5. Tobacco use*    6. Type 2 diabetes mellitus with stage 3 chronic kidney disease, with long-term current use of insulin (CMS/[facility])    7. Chronic fatigue    8. Erectile dysfunction, unspecified erectile dysfunction type          Plan:      Patient complains of severe dry hacking cough mostly at night without any fever or chills.  Cough probably related to lisinopril.  Patient was advised to DC lisinopril and start valsartan 160 twice a day.  Will also get lab work and chest x-ray to exclude other causes of cough    He complains of erectile dysfunction also will get free and total testosterone level.    Patient has hyperlipidemia and diabetes mellitus he has not had lab work and while lab work was scheduled.  Because of fatigue will check thyroid functions also.  Cologuard stool test was ordered.  Health maintenance issues were discussed.  Cessation of tobacco use is very strongly urged.          No Follow-up on file.

## 2019-04-09 NOTE — PROGRESS NOTES
subjective     Chief Complaint   Patient presents with   • Hypertension   • Follow-up     History of Present Illness    Patient is 53 years old white male who is here for follow-up.  Patient states that his blood pressure is very well controlled.  He is taking hydralazine 25 mg 3 times a day along with the Diovan 160 twice daily and Norvasc 10 mg daily.  His blood pressure runs around 130s systolic.  No drug side effects.    Patient also has diabetes mellitus insulin-dependent.  His blood sugar according to him is around 100-130.  Patient was supposed to have lab work done but he forgot he will have lab work done within the next 2 3 days.  He continues to use tobacco and has not lost weight as directed.  Past Surgical History:   Procedure Laterality Date   • CARDIOVASCULAR STRESS TEST  04/2015   • ECHO - CONVERTED  10/2014   • TEETH EXTRACTION       Family History   Problem Relation Age of Onset   • Cancer Sister    • Diabetes Sister      Past Medical History:   Diagnosis Date   • Diabetes mellitus (CMS/HCC)    • Hyperlipidemia    • Hypertension    • Leg cramps    • Obesity    • Peripheral neuritis    • Tobacco use      Patient Active Problem List   Diagnosis   • Leg cramps*   • Peripheral neuritis*   • Obesity*   • Tobacco use*   • Hyperlipidemia type IV*   • Hypertension*   • Type 2 diabetes mellitus with chronic kidney disease, with long-term current use of insulin (CMS/[facility])   • Chronic fatigue   • Elevated CPK   • Chronic kidney disease   • Mass, right forehead   • Chronic cough       Social History     Tobacco Use   • Smoking status: Current Every Day Smoker     Packs/day: 1.50     Years: 35.00     Pack years: 52.50     Types: Cigarettes   • Smokeless tobacco: Never Used   Substance Use Topics   • Alcohol use: No   • Drug use: No       No Known Allergies    Current Outpatient Medications on File Prior to Visit   Medication Sig   • amLODIPine (NORVASC) 10 MG tablet Take 10 mg by mouth Daily.   • BD PEN NEEDLE  "NOLBERTO U/F 32G X 4 MM misc USE FOUR TIMES DAILY AS DIRECTED   • Blood Glucose Monitoring Suppl (ONE TOUCH ULTRA 2) W/DEVICE kit 1 kit 3 (Three) Times a Day. E11.9   • gabapentin (NEURONTIN) 300 MG capsule Take 800 mg by mouth 3 (Three) Times a Day.   • hydrALAZINE (APRESOLINE) 25 MG tablet TAKE 1 TABLET BY MOUTH THREE TIMES DAILY   • insulin aspart (novoLOG FLEXPEN) 100 UNIT/ML solution pen-injector sc pen Inject 10 Units under the skin into the appropriate area as directed 3 (Three) Times a Day With Meals.   • Insulin Glargine (BASAGLAR KWIKPEN) 100 UNIT/ML injection pen INJECT 45 UNITS UNDER THE SKIN EVERY DAY   • Lancets (ONETOUCH ULTRASOFT) lancets Pt needs to check blood sugar 3 times a day E11.9   • ONE TOUCH ULTRA TEST test strip USE TO TEST BLOOD SUGAR THREE TIMES DAILY   • valsartan (DIOVAN) 160 MG tablet Take 1 tablet by mouth 2 (Two) Times a Day.     No current facility-administered medications on file prior to visit.          The following portions of the patient's history were reviewed and updated as appropriate: allergies, current medications, past family history, past medical history, past social history, past surgical history and problem list.    Review of Systems   Constitution: Negative.   HENT: Negative.  Negative for congestion.    Eyes: Negative.    Cardiovascular: Negative.  Negative for chest pain, cyanosis, dyspnea on exertion, irregular heartbeat, leg swelling, near-syncope, orthopnea, palpitations, paroxysmal nocturnal dyspnea and syncope.   Respiratory: Negative.  Negative for shortness of breath.    Hematologic/Lymphatic: Negative.    Musculoskeletal: Negative.    Gastrointestinal: Negative.    Neurological: Positive for numbness and paresthesias. Negative for headaches.          Objective:     /84 (BP Location: Left arm, Patient Position: Sitting)   Pulse 81   Ht 172.7 cm (68\")   Wt 84.4 kg (186 lb)   SpO2 93%   BMI 28.28 kg/m²   Physical Exam   Constitutional: He appears " well-developed and well-nourished. No distress.   HENT:   Head: Normocephalic and atraumatic.   Mouth/Throat: Oropharynx is clear and moist. No oropharyngeal exudate.   Eyes: Conjunctivae and EOM are normal. Pupils are equal, round, and reactive to light. No scleral icterus.   Neck: Normal range of motion. Neck supple. No JVD present. No tracheal deviation present. No thyromegaly present.   Cardiovascular: Normal rate, regular rhythm, normal heart sounds and intact distal pulses. PMI is not displaced. Exam reveals no gallop, no friction rub and no decreased pulses.   No murmur heard.  Pulses:       Carotid pulses are 3+ on the right side, and 3+ on the left side.       Radial pulses are 3+ on the right side, and 3+ on the left side.   Pulmonary/Chest: Effort normal and breath sounds normal. No respiratory distress. He has no wheezes. He has no rales. He exhibits no tenderness.   Abdominal: Soft. Bowel sounds are normal. He exhibits no distension, no abdominal bruit and no mass. There is no splenomegaly or hepatomegaly. There is no tenderness. There is no rebound and no guarding.   Musculoskeletal: Normal range of motion. He exhibits no edema, tenderness or deformity.   Lymphadenopathy:     He has no cervical adenopathy.   Neurological: He is alert. He has normal reflexes. No cranial nerve deficit. He exhibits normal muscle tone. Coordination normal.   Skin: Skin is warm and dry. No rash noted. He is not diaphoretic. No erythema.   Psychiatric: He has a normal mood and affect. His behavior is normal. Judgment and thought content normal.         Lab Review  Lab Results   Component Value Date     05/16/2018    K 4.3 05/16/2018     05/16/2018    BUN 21 05/16/2018    CREATININE 1.56 (H) 05/16/2018    GLUCOSE 132 (H) 05/16/2018    CALCIUM 9.1 05/16/2018    ALT 23 05/16/2018    ALKPHOS 72 05/16/2018    LABIL2 1.5 01/29/2016     Lab Results   Component Value Date    CKTOTAL 235 (H) 09/28/2018     Lab Results    Component Value Date    WBC 11.24 05/16/2018    HGB 15.0 05/16/2018    HCT 44.7 05/16/2018     05/16/2018     No results found for: INR  No results found for: MG  Lab Results   Component Value Date    PSA 0.390 05/16/2018    TSH 2.812 12/06/2016     No results found for: BNP  Lab Results   Component Value Date    CHLPL 128 01/29/2016    CHOL 180 05/16/2018    TRIG 328 (H) 05/16/2018    HDL 31 (L) 05/16/2018    VLDL 65.6 05/16/2018    LDLHDL 2.69 05/16/2018     Lab Results   Component Value Date    LDL 83 05/16/2018    LDL 50 11/06/2017       Procedures       I personally viewed and interpreted the patient's LAB data         Assessment:     1. Essential hypertension    2. Hyperlipidemia type IV*    3. Class 1 obesity without serious comorbidity with body mass index (BMI) of 30.0 to 30.9 in adult, unspecified obesity type    4. Tobacco use*    5. Type 2 diabetes mellitus with stage 3 chronic kidney disease, with long-term current use of insulin (CMS/[facility])    6. Stage 2 chronic kidney disease          Plan:     Blood pressure is very well controlled.  Patient will continue taking hydralazine, Diovan and Norvasc.  Patient was advised to lose weight.  Healthy lifestyle was emphasized.    Patient has renal failure he was advised to drink more fluids.  Lab work was again scheduled.  Cessation of tobacco use was also stressed.  Follow-up scheduled        Return in about 3 months (around 7/9/2019).

## 2019-04-26 NOTE — TELEPHONE ENCOUNTER
----- Message from Fredrick Saldivar MD sent at 4/26/2019  9:36 AM EDT -----  Kidney ultrasound is good

## 2019-05-09 NOTE — PROGRESS NOTES
subjective     Chief Complaint   Patient presents with   • Results     LABS   • Follow-up     History of Present Illness  Patient is 53 years old white male who is here for follow-up.  Patient has multiple chronic medical problems.      Blood pressure is well controlled.  He is taking Norvasc 5 mg daily, hydralazine 25 mg 3 times daily, Normodyne 50 twice daily and Diovan 160 daily.  Patient is tolerating medications very well and blood pressure is been normal.    Patient has diabetes mellitus.  He has been taking NovoLog 10 units 3 times a day and Bacid Barb 45 units daily.  Blood sugar is still running quite high lab work was reviewed.    Patient also has renal failure creatinine is 1.66 patient is asymptomatic.    Patient also is overweight and is trying to lose weight.  He continues to smoke.  He has hypertriglyceridemia primarily due to obesity and diabetes mellitus.  Patient has not lost any weight    Past Surgical History:   Procedure Laterality Date   • CARDIOVASCULAR STRESS TEST  04/2015   • ECHO - CONVERTED  10/2014   • TEETH EXTRACTION       Family History   Problem Relation Age of Onset   • Cancer Sister    • Diabetes Sister      Past Medical History:   Diagnosis Date   • Diabetes mellitus (CMS/[facility])    • Hyperlipidemia    • Hypertension    • Leg cramps    • Obesity    • Peripheral neuritis    • Tobacco use      Patient Active Problem List   Diagnosis   • Leg cramps*   • Peripheral neuritis*   • Obesity*   • Tobacco use*   • Hyperlipidemia type IV*   • Hypertension*   • Type 2 diabetes mellitus with chronic kidney disease, with long-term current use of insulin (CMS/HCC)   • Chronic fatigue   • Elevated CPK   • Chronic kidney disease   • Mass, right forehead   • Chronic cough       Social History     Tobacco Use   • Smoking status: Current Every Day Smoker     Packs/day: 1.50     Years: 35.00     Pack years: 52.50     Types: Cigarettes   • Smokeless tobacco: Never Used   Substance Use Topics   • Alcohol  use: No   • Drug use: No       No Known Allergies    Current Outpatient Medications on File Prior to Visit   Medication Sig   • amLODIPine (NORVASC) 5 MG tablet TAKE 1 TABLET BY MOUTH DAILY   • BD PEN NEEDLE NOLBERTO U/F 32G X 4 MM misc USE TO INJECT INSULIN FOUR TIMES DAILY   • Blood Glucose Monitoring Suppl (ONE TOUCH ULTRA 2) W/DEVICE kit 1 kit 3 (Three) Times a Day. E11.9   • gabapentin (NEURONTIN) 300 MG capsule Take 800 mg by mouth 3 (Three) Times a Day.   • hydrALAZINE (APRESOLINE) 25 MG tablet TAKE 1 TABLET BY MOUTH THREE TIMES DAILY   • insulin aspart (novoLOG FLEXPEN) 100 UNIT/ML solution pen-injector sc pen Inject 10 Units under the skin into the appropriate area as directed 3 (Three) Times a Day With Meals.   • Insulin Glargine (BASAGLAR KWIKPEN) 100 UNIT/ML injection pen INJECT 45 UNITS UNDER THE SKIN EVERY DAY   • labetalol (NORMODYNE) 100 MG tablet 1/2 tablet twice a day.   • Lancets (ONETOUCH ULTRASOFT) lancets Pt needs to check blood sugar 3 times a day E11.9   • ONE TOUCH ULTRA TEST test strip USE TO TEST BLOOD SUGAR THREE TIMES DAILY   • valsartan (DIOVAN) 160 MG tablet Take 1 tablet by mouth Daily.   • [DISCONTINUED] amLODIPine (NORVASC) 10 MG tablet Take 10 mg by mouth Daily.     No current facility-administered medications on file prior to visit.          The following portions of the patient's history were reviewed and updated as appropriate: allergies, current medications, past family history, past medical history, past social history, past surgical history and problem list.    Review of Systems   Constitution: Negative.   HENT: Negative.  Negative for congestion.    Eyes: Negative.    Cardiovascular: Negative.  Negative for chest pain, cyanosis, dyspnea on exertion, irregular heartbeat, leg swelling, near-syncope, orthopnea, palpitations, paroxysmal nocturnal dyspnea and syncope.   Respiratory: Negative.  Negative for shortness of breath.    Hematologic/Lymphatic: Negative.    Musculoskeletal:  "Negative.    Gastrointestinal: Negative.    Neurological: Negative.  Negative for headaches.          Objective:     /76 (BP Location: Left arm, Patient Position: Sitting)   Pulse 72   Ht 172.7 cm (68\")   Wt 85.3 kg (188 lb)   SpO2 93%   BMI 28.59 kg/m²   Physical Exam   Constitutional: He appears well-developed and well-nourished. No distress.   HENT:   Head: Normocephalic and atraumatic.   Mouth/Throat: Oropharynx is clear and moist. No oropharyngeal exudate.   Eyes: Conjunctivae and EOM are normal. Pupils are equal, round, and reactive to light. No scleral icterus.   Neck: Normal range of motion. Neck supple. No JVD present. No tracheal deviation present. No thyromegaly present.   Cardiovascular: Normal rate, regular rhythm, normal heart sounds and intact distal pulses. PMI is not displaced. Exam reveals no gallop, no friction rub and no decreased pulses.   No murmur heard.  Pulses:       Carotid pulses are 3+ on the right side, and 3+ on the left side.       Radial pulses are 3+ on the right side, and 3+ on the left side.   Pulmonary/Chest: Effort normal and breath sounds normal. No respiratory distress. He has no wheezes. He has no rales. He exhibits no tenderness.   Abdominal: Soft. Bowel sounds are normal. He exhibits no distension, no abdominal bruit and no mass. There is no splenomegaly or hepatomegaly. There is no tenderness. There is no rebound and no guarding.   Musculoskeletal: Normal range of motion. He exhibits no edema, tenderness or deformity.   Lymphadenopathy:     He has no cervical adenopathy.   Neurological: He is alert. He has normal reflexes. No cranial nerve deficit. He exhibits normal muscle tone. Coordination normal.   Skin: Skin is warm and dry. No rash noted. He is not diaphoretic. No erythema.   Psychiatric: He has a normal mood and affect. His behavior is normal. Judgment and thought content normal.         Lab Review  Lab Results   Component Value Date     04/11/2019 "    K 5.2 04/11/2019     04/11/2019    BUN 27 (H) 04/11/2019    CREATININE 1.66 (H) 04/11/2019    GLUCOSE 144 (H) 04/11/2019    CALCIUM 9.6 04/11/2019    ALT 17 04/11/2019    ALKPHOS 73 04/11/2019    LABIL2 1.5 01/29/2016     Lab Results   Component Value Date    CKTOTAL 306 (H) 04/11/2019     Lab Results   Component Value Date    WBC 14.82 (H) 04/11/2019    HGB 14.1 04/11/2019    HCT 45.2 04/11/2019     04/11/2019     No results found for: INR  No results found for: MG  Lab Results   Component Value Date    PSA 0.390 05/16/2018    TSH 2.470 04/11/2019     No results found for: BNP  Lab Results   Component Value Date    CHLPL 128 01/29/2016    CHOL 179 04/11/2019    TRIG 195 (H) 04/11/2019    HDL 34 (L) 04/11/2019    VLDL 39 04/11/2019    LDLHDL 3.12 04/11/2019     Lab Results   Component Value Date     (H) 04/11/2019    LDL 83 05/16/2018       Procedures       I personally viewed and interpreted the patient's LAB data         Assessment:     1. Hyperlipidemia type IV*    2. Essential hypertension    3. Class 1 obesity without serious comorbidity with body mass index (BMI) of 30.0 to 30.9 in adult, unspecified obesity type    4. Tobacco use*    5. Type 2 diabetes mellitus with stage 3 chronic kidney disease, with long-term current use of insulin (CMS/[facility])    6. Stage 2 chronic kidney disease    7. Elevated CPK          Plan:     Labs reviewed and discussed with the patient  Lipid panel shows total cholesterol of 179 triglyceride 195 .  Patient could not tolerate statin because of elevated CPK and severe myalgias.    Blood sugar is 144 with hemoglobin A1c 6.13.  Patient was advised to take care Basaglar 45 units daily and 10 units Novolin 3 times a day sliding scale.  Patient is overweight healthy lifestyle and weight loss was emphasized.    Patient has developed renal failure with creatinine 1.66.  He was advised to drink more fluids.  He is not taking any arthritis medications.  Diovan was  decreased to 1 a day and patient was given labetalol 50 twice daily.  When the results were available.  Follow-up scheduled with repeat lab work        No Follow-up on file.

## 2019-05-09 NOTE — PATIENT INSTRUCTIONS
Steps to Quit Smoking  Smoking tobacco can be harmful to your health and can affect almost every organ in your body. Smoking puts you, and those around you, at risk for developing many serious chronic diseases. Quitting smoking is difficult, but it is one of the best things that you can do for your health. It is never too late to quit.  What are the benefits of quitting smoking?  When you quit smoking, you lower your risk of developing serious diseases and conditions, such as:  · Lung cancer or lung disease, such as COPD.  · Heart disease.  · Stroke.  · Heart attack.  · Infertility.  · Osteoporosis and bone fractures.    Additionally, symptoms such as coughing, wheezing, and shortness of breath may get better when you quit. You may also find that you get sick less often because your body is stronger at fighting off colds and infections. If you are pregnant, quitting smoking can help to reduce your chances of having a baby of low birth weight.  How do I get ready to quit?  When you decide to quit smoking, create a plan to make sure that you are successful. Before you quit:  · Pick a date to quit. Set a date within the next two weeks to give you time to prepare.  · Write down the reasons why you are quitting. Keep this list in places where you will see it often, such as on your bathroom mirror or in your car or wallet.  · Identify the people, places, things, and activities that make you want to smoke (triggers) and avoid them. Make sure to take these actions:  ? Throw away all cigarettes at home, at work, and in your car.  ? Throw away smoking accessories, such as ashtrays and lighters.  ? Clean your car and make sure to empty the ashtray.  ? Clean your home, including curtains and carpets.  · Tell your family, friends, and coworkers that you are quitting. Support from your loved ones can make quitting easier.  · Talk with your health care provider about your options for quitting smoking.  · Find out what treatment  options are covered by your health insurance.    What strategies can I use to quit smoking?  Talk with your healthcare provider about different strategies to quit smoking. Some strategies include:  · Quitting smoking altogether instead of gradually lessening how much you smoke over a period of time. Research shows that quitting “cold turkey” is more successful than gradually quitting.  · Attending in-person counseling to help you build problem-solving skills. You are more likely to have success in quitting if you attend several counseling sessions. Even short sessions of 10 minutes can be effective.  · Finding resources and support systems that can help you to quit smoking and remain smoke-free after you quit. These resources are most helpful when you use them often. They can include:  ? Online chats with a counselor.  ? Telephone quitlines.  ? Printed self-help materials.  ? Support groups or group counseling.  ? Text messaging programs.  ? Mobile phone applications.  · Taking medicines to help you quit smoking. (If you are pregnant or breastfeeding, talk with your health care provider first.) Some medicines contain nicotine and some do not. Both types of medicines help with cravings, but the medicines that include nicotine help to relieve withdrawal symptoms. Your health care provider may recommend:  ? Nicotine patches, gum, or lozenges.  ? Nicotine inhalers or sprays.  ? Non-nicotine medicine that is taken by mouth.    Talk with your health care provider about combining strategies, such as taking medicines while you are also receiving in-person counseling. Using these two strategies together makes you more likely to succeed in quitting than if you used either strategy on its own.  If you are pregnant or breastfeeding, talk with your health care provider about finding counseling or other support strategies to quit smoking. Do not take medicine to help you quit smoking unless told to do so by your health care  provider.  What things can I do to make it easier to quit?  Quitting smoking might feel overwhelming at first, but there is a lot that you can do to make it easier. Take these important actions:  · Reach out to your family and friends and ask that they support and encourage you during this time. Call telephone quitlines, reach out to support groups, or work with a counselor for support.  · Ask people who smoke to avoid smoking around you.  · Avoid places that trigger you to smoke, such as bars, parties, or smoke-break areas at work.  · Spend time around people who do not smoke.  · Lessen stress in your life, because stress can be a smoking trigger for some people. To lessen stress, try:  ? Exercising regularly.  ? Deep-breathing exercises.  ? Yoga.  ? Meditating.  ? Performing a body scan. This involves closing your eyes, scanning your body from head to toe, and noticing which parts of your body are particularly tense. Purposefully relax the muscles in those areas.  · Download or purchase mobile phone or tablet apps (applications) that can help you stick to your quit plan by providing reminders, tips, and encouragement. There are many free apps, such as QuitGuide from the CDC (Centers for Disease Control and Prevention). You can find other support for quitting smoking (smoking cessation) through smokefree.gov and other websites.    How will I feel when I quit smoking?  Within the first 24 hours of quitting smoking, you may start to feel some withdrawal symptoms. These symptoms are usually most noticeable 2-3 days after quitting, but they usually do not last beyond 2-3 weeks. Changes or symptoms that you might experience include:  · Mood swings.  · Restlessness, anxiety, or irritation.  · Difficulty concentrating.  · Dizziness.  · Strong cravings for sugary foods in addition to nicotine.  · Mild weight gain.  · Constipation.  · Nausea.  · Coughing or a sore throat.  · Changes in how your medicines work in your  body.  · A depressed mood.  · Difficulty sleeping (insomnia).    After the first 2-3 weeks of quitting, you may start to notice more positive results, such as:  · Improved sense of smell and taste.  · Decreased coughing and sore throat.  · Slower heart rate.  · Lower blood pressure.  · Clearer skin.  · The ability to breathe more easily.  · Fewer sick days.    Quitting smoking is very challenging for most people. Do not get discouraged if you are not successful the first time. Some people need to make many attempts to quit before they achieve long-term success. Do your best to stick to your quit plan, and talk with your health care provider if you have any questions or concerns.  This information is not intended to replace advice given to you by your health care provider. Make sure you discuss any questions you have with your health care provider.  Document Released: 12/12/2002 Document Revised: 07/24/2018 Document Reviewed: 05/03/2016  [employer] Interactive Patient Education © 2019 [employer]

## 2019-08-08 NOTE — PROGRESS NOTES
subjective     Chief Complaint   Patient presents with   • Hypertension   • Hyperlipidemia   • Follow-up     History of Present Illness  Patient complains of peripheral neuropathy.  According to him his feet burn and tingle.  Patient went to the emergency room where he was given Tylenol 3.  Patient felt much better and states that he is able to mow his yard and walk around.  Just also and is taking Neurontin 800 mg 3 times a day.    Blood pressure is controlled with the Diovan, hydralazine asked and Normodyne.    Patient states that he had to go to the hospital because low blood sugar because he took his insulin and forgot to eat.  Very good he brought his blood pressure readings 3 times a day which are good.  He is taking Basaglar 45 units for long-acting and NovoLog FlexPen 10 to 15 units depending on blood sugar readings.    Past Surgical History:   Procedure Laterality Date   • CARDIOVASCULAR STRESS TEST  04/2015   • ECHO - CONVERTED  10/2014   • TEETH EXTRACTION       Family History   Problem Relation Age of Onset   • Cancer Sister    • Diabetes Sister      Past Medical History:   Diagnosis Date   • Diabetes mellitus (CMS/HCC)    • Hyperlipidemia    • Hypertension    • Leg cramps    • Obesity    • Peripheral neuritis    • Tobacco use      Patient Active Problem List   Diagnosis   • Leg cramps*   • Peripheral neuritis*   • Obesity*   • Tobacco use*   • Hyperlipidemia type IV*   • Hypertension*   • Type 2 diabetes mellitus with chronic kidney disease, with long-term current use of insulin (CMS/HCC)   • Chronic fatigue   • Elevated CPK   • Chronic kidney disease   • Mass, right forehead   • Chronic cough       Social History     Tobacco Use   • Smoking status: Current Every Day Smoker     Packs/day: 1.50     Years: 35.00     Pack years: 52.50     Types: Cigarettes   • Smokeless tobacco: Never Used   Substance Use Topics   • Alcohol use: No   • Drug use: No       No Known Allergies    Current Outpatient Medications  on File Prior to Visit   Medication Sig   • acetaminophen-codeine (TYLENOL #3) 300-30 MG per tablet Take 1 tablet by mouth Every 4 (Four) Hours As Needed for Moderate Pain .   • amLODIPine (NORVASC) 5 MG tablet TAKE 1 TABLET BY MOUTH DAILY   • baclofen (LIORESAL) 20 MG tablet Take 20 mg by mouth 3 (Three) Times a Day.   • BD PEN NEEDLE NOLBERTO U/F 32G X 4 MM misc USE TO INJECT INSULIN FOUR TIMES DAILY   • Blood Glucose Monitoring Suppl (ONE TOUCH ULTRA 2) W/DEVICE kit 1 kit 3 (Three) Times a Day. E11.9   • gabapentin (NEURONTIN) 300 MG capsule Take 800 mg by mouth 3 (Three) Times a Day.   • hydrALAZINE (APRESOLINE) 25 MG tablet TAKE 1 TABLET BY MOUTH THREE TIMES DAILY   • insulin aspart (novoLOG FLEXPEN) 100 UNIT/ML solution pen-injector sc pen Inject 10 Units under the skin into the appropriate area as directed 3 (Three) Times a Day With Meals.   • Insulin Glargine (BASAGLAR KWIKPEN) 100 UNIT/ML injection pen INJECT 45 UNITS UNDER THE SKIN EVERY DAY   • labetalol (NORMODYNE) 100 MG tablet TAKE 1/2 TABLET BY MOUTH TWICE DAILY   • Lancets (ONETOUCH ULTRASOFT) lancets Pt needs to check blood sugar 3 times a day E11.9   • ONE TOUCH ULTRA TEST test strip USE TO TEST BLOOD SUGAR THREE TIMES DAILY   • valsartan (DIOVAN) 160 MG tablet TAKE 1 TABLET BY MOUTH EVERY DAY   • venlafaxine (EFFEXOR) 75 MG tablet Take 75 mg by mouth 2 (Two) Times a Day.     No current facility-administered medications on file prior to visit.          The following portions of the patient's history were reviewed and updated as appropriate: allergies, current medications, past family history, past medical history, past social history, past surgical history and problem list.    Review of Systems   Constitution: Negative.   HENT: Negative.  Negative for congestion.    Eyes: Negative.    Cardiovascular: Negative.  Negative for chest pain, cyanosis, dyspnea on exertion, irregular heartbeat, leg swelling, near-syncope, orthopnea, palpitations, paroxysmal  "nocturnal dyspnea and syncope.   Respiratory: Negative.  Negative for shortness of breath.    Hematologic/Lymphatic: Negative.    Musculoskeletal: Negative.    Gastrointestinal: Negative.    Neurological: Positive for numbness and paresthesias. Negative for headaches.          Objective:     /80 (BP Location: Left arm, Patient Position: Sitting)   Pulse 83   Ht 172.7 cm (68\")   Wt 79.4 kg (175 lb)   SpO2 94%   BMI 26.61 kg/m²   Physical Exam   Constitutional: He appears well-developed and well-nourished. No distress.   HENT:   Head: Normocephalic and atraumatic.   Mouth/Throat: Oropharynx is clear and moist. No oropharyngeal exudate.   Eyes: Conjunctivae and EOM are normal. Pupils are equal, round, and reactive to light. No scleral icterus.   Neck: Normal range of motion. Neck supple. No JVD present. No tracheal deviation present. No thyromegaly present.   Cardiovascular: Normal rate, regular rhythm, normal heart sounds and intact distal pulses. PMI is not displaced. Exam reveals no gallop, no friction rub and no decreased pulses.   No murmur heard.  Pulses:       Carotid pulses are 3+ on the right side, and 3+ on the left side.       Radial pulses are 3+ on the right side, and 3+ on the left side.   Pulmonary/Chest: Effort normal and breath sounds normal. No respiratory distress. He has no wheezes. He has no rales. He exhibits no tenderness.   Abdominal: Soft. Bowel sounds are normal. He exhibits no distension, no abdominal bruit and no mass. There is no splenomegaly or hepatomegaly. There is no tenderness. There is no rebound and no guarding.   Musculoskeletal: Normal range of motion. He exhibits no edema, tenderness or deformity.   Lymphadenopathy:     He has no cervical adenopathy.   Neurological: He is alert. He has normal reflexes. No cranial nerve deficit. He exhibits normal muscle tone. Coordination normal.   Skin: Skin is warm and dry. No rash noted. He is not diaphoretic. No erythema. "   Psychiatric: He has a normal mood and affect. His behavior is normal. Judgment and thought content normal.         Lab Review  Lab Results   Component Value Date     08/06/2019    K 4.8 08/06/2019     08/06/2019    BUN 36 (H) 08/06/2019    CREATININE 1.70 (H) 08/06/2019    GLUCOSE 114 (H) 08/06/2019    CALCIUM 8.9 08/06/2019    ALT 19 08/06/2019    ALKPHOS 67 08/06/2019    LABIL2 1.5 01/29/2016     Lab Results   Component Value Date    CKTOTAL 332 (H) 08/06/2019     Lab Results   Component Value Date    WBC 8.28 08/06/2019    HGB 14.4 08/06/2019    HCT 47.2 08/06/2019     08/06/2019     No results found for: INR  Lab Results   Component Value Date    MG 2.0 05/20/2019     Lab Results   Component Value Date    PSA 0.390 05/16/2018    TSH 2.150 05/20/2019     No results found for: BNP  Lab Results   Component Value Date    CHLPL 128 01/29/2016    CHOL 179 04/11/2019    TRIG 195 (H) 04/11/2019    HDL 34 (L) 04/11/2019    VLDL 39 04/11/2019    LDLHDL 3.12 04/11/2019     Lab Results   Component Value Date     (H) 04/11/2019    LDL 83 05/16/2018       Procedures       I personally viewed and interpreted the patient's LAB data         Assessment:     1. Type 2 diabetes mellitus with stage 3 chronic kidney disease, with long-term current use of insulin (CMS/[facility])    2. Peripheral neuritis*    3. Essential hypertension    4. Hyperlipidemia type IV*    5. Tobacco use*          Plan:     Diabetes control is better.  Patient was advised to drink more fluid for elevated creatinine.  He will continue with Dr. salmon for peripheral neuritis.  Lipid control is suboptimal will check lab work again.  Cessation of tobacco use was stressed.  Podiatry consult was recommended        No Follow-up on file.

## 2019-11-14 NOTE — PROGRESS NOTES
subjective     Chief Complaint   Patient presents with   • Results     Labs   • Hypertension   • Diabetes     History of Present Illness  Patient is 54 years old white male who is here for follow-up.  Patient has essential hypertension, blood pressure is not very well controlled with current medications.  He is taking medications regularly.    Patient complains of muscle stiffness myalgias, his CPK is elevated.  It has been elevated for last 3 to 4 years at least and he is not taking any statin therapy.    Patient has type IV hyperlipidemia and diabetes mellitus  Patient is taking NovoLog and Basaglar.  Because of diabetes mellitus patient wishes to have diabetic shoes  There is no PAD but callus formation is noted.    Past Surgical History:   Procedure Laterality Date   • CARDIOVASCULAR STRESS TEST  04/2015   • ECHO - CONVERTED  10/2014   • TEETH EXTRACTION       Family History   Problem Relation Age of Onset   • Cancer Sister    • Diabetes Sister      Past Medical History:   Diagnosis Date   • Diabetes mellitus (CMS/HCC)    • Hyperlipidemia    • Hypertension    • Leg cramps    • Obesity    • Peripheral neuritis    • Tobacco use      Patient Active Problem List   Diagnosis   • Leg cramps*   • Peripheral neuritis*   • Obesity*   • Tobacco use*   • Hyperlipidemia type IV*   • Hypertension*   • Type 2 diabetes mellitus with chronic kidney disease, with long-term current use of insulin (CMS/HCC)   • Chronic fatigue   • Elevated CPK   • Chronic kidney disease   • Mass, right forehead   • Chronic cough       Social History     Tobacco Use   • Smoking status: Current Every Day Smoker     Packs/day: 1.50     Years: 35.00     Pack years: 52.50     Types: Cigarettes   • Smokeless tobacco: Never Used   Substance Use Topics   • Alcohol use: No   • Drug use: No       No Known Allergies    Current Outpatient Medications on File Prior to Visit   Medication Sig   • acetaminophen-codeine (TYLENOL #3) 300-30 MG per tablet Take 1  tablet by mouth Every 4 (Four) Hours As Needed for Moderate Pain .   • amLODIPine (NORVASC) 5 MG tablet TAKE 1 TABLET BY MOUTH DAILY   • baclofen (LIORESAL) 20 MG tablet Take 20 mg by mouth 3 (Three) Times a Day.   • BD PEN NEEDLE NOLBERTO U/F 32G X 4 MM misc USE TO INJECT FOUR TIMES DAILY   • Blood Glucose Monitoring Suppl (ONE TOUCH ULTRA 2) W/DEVICE kit 1 kit 3 (Three) Times a Day. E11.9   • gabapentin (NEURONTIN) 300 MG capsule Take 800 mg by mouth 3 (Three) Times a Day.   • insulin aspart (novoLOG FLEXPEN) 100 UNIT/ML solution pen-injector sc pen Inject 10 Units under the skin into the appropriate area as directed 3 (Three) Times a Day With Meals.   • Insulin Glargine (BASAGLAR KWIKPEN) 100 UNIT/ML injection pen INJECT 45 UNITS UNDER THE SKIN EVERY DAY   • Lancets (ONETOUCH ULTRASOFT) lancets Pt needs to check blood sugar 3 times a day E11.9   • ONE TOUCH ULTRA TEST test strip USE TO TEST BLOOD SUGAR THREE TIMES DAILY   • venlafaxine (EFFEXOR) 75 MG tablet Take 75 mg by mouth 2 (Two) Times a Day.   • [DISCONTINUED] hydrALAZINE (APRESOLINE) 25 MG tablet TAKE 1 TABLET BY MOUTH THREE TIMES DAILY   • [DISCONTINUED] labetalol (NORMODYNE) 100 MG tablet TAKE 1/2 TABLET BY MOUTH TWICE DAILY   • [DISCONTINUED] valsartan (DIOVAN) 160 MG tablet TAKE 1 TABLET BY MOUTH EVERY DAY     No current facility-administered medications on file prior to visit.          The following portions of the patient's history were reviewed and updated as appropriate: allergies, current medications, past family history, past medical history, past social history, past surgical history and problem list.    Review of Systems   Constitution: Negative.   HENT: Negative.  Negative for congestion.    Eyes: Negative.    Cardiovascular: Negative.  Negative for chest pain, cyanosis, dyspnea on exertion, irregular heartbeat, leg swelling, near-syncope, orthopnea, palpitations, paroxysmal nocturnal dyspnea and syncope.   Respiratory: Negative.  Negative for  "shortness of breath.    Hematologic/Lymphatic: Negative.    Musculoskeletal: Positive for myalgias and stiffness.   Gastrointestinal: Negative.    Neurological: Negative.  Negative for headaches.          Objective:     /82   Pulse 85   Ht 172.7 cm (68\")   Wt 79.8 kg (176 lb)   SpO2 100%   BMI 26.76 kg/m²   Physical Exam   Constitutional: He appears well-developed and well-nourished. No distress.   HENT:   Head: Normocephalic and atraumatic.   Mouth/Throat: Oropharynx is clear and moist. No oropharyngeal exudate.   Eyes: Conjunctivae and EOM are normal. Pupils are equal, round, and reactive to light. No scleral icterus.   Neck: Normal range of motion. Neck supple. No JVD present. No tracheal deviation present. No thyromegaly present.   Cardiovascular: Normal rate, regular rhythm, normal heart sounds and intact distal pulses. PMI is not displaced. Exam reveals no gallop, no friction rub and no decreased pulses.   No murmur heard.  Pulses:       Carotid pulses are 3+ on the right side, and 3+ on the left side.       Radial pulses are 3+ on the right side, and 3+ on the left side.   Pulmonary/Chest: Effort normal and breath sounds normal. No respiratory distress. He has no wheezes. He has no rales. He exhibits no tenderness.   Abdominal: Soft. Bowel sounds are normal. He exhibits no distension, no abdominal bruit and no mass. There is no splenomegaly or hepatomegaly. There is no tenderness. There is no rebound and no guarding.   Musculoskeletal: Normal range of motion. He exhibits no edema, tenderness or deformity.   Lymphadenopathy:     He has no cervical adenopathy.   Neurological: He is alert. He has normal reflexes. No cranial nerve deficit. He exhibits normal muscle tone. Coordination normal.   Skin: Skin is warm and dry. No rash noted. He is not diaphoretic. No erythema.   Psychiatric: He has a normal mood and affect. His behavior is normal. Judgment and thought content normal.         Lab Review  Lab " Results   Component Value Date     11/11/2019    K 4.8 11/11/2019     11/11/2019    BUN 27 (H) 11/11/2019    CREATININE 1.75 (H) 11/11/2019    GLUCOSE 101 (H) 11/11/2019    CALCIUM 9.0 11/11/2019    ALT 27 11/11/2019    ALKPHOS 87 11/11/2019    LABIL2 1.5 01/29/2016     Lab Results   Component Value Date    CKTOTAL 834 (H) 11/11/2019     Lab Results   Component Value Date    WBC 10.17 11/11/2019    HGB 12.5 (L) 11/11/2019    HCT 37.3 (L) 11/11/2019     11/11/2019     No results found for: INR  Lab Results   Component Value Date    MG 2.0 05/20/2019     Lab Results   Component Value Date    PSA 0.390 05/16/2018    TSH 2.150 05/20/2019     No results found for: BNP  Lab Results   Component Value Date    CHLPL 128 01/29/2016    CHOL 160 11/11/2019    TRIG 201 (H) 11/11/2019    HDL 33 (L) 11/11/2019    VLDL 40.2 (H) 11/11/2019    LDLHDL 2.63 11/11/2019     Lab Results   Component Value Date    LDL 87 11/11/2019     (H) 04/11/2019       Procedures       I personally viewed and interpreted the patient's LAB data         Assessment:     1. Essential hypertension    2. Screening for colon cancer    3. Hyperlipidemia type IV*    4. Type 2 diabetes mellitus with stage 3 chronic kidney disease, with long-term current use of insulin (CMS/[facility])    5. Peripheral neuritis*    6. Chronic fatigue    7. Elevated CPK    8. Stage 3 chronic kidney disease (CMS/[facility])    9. Tobacco use*          Plan:     Labs reviewed and discussed with the patient, CPK is over 800 and he is not taking any statin therapy.  Patient also has not had any heavy physical exertion.  He complains of myalgias.  Will get CHARLEEN CCP and will arrange rheumatology consult.    Blood pressure is elevated.  Patient was advised to increase hydralazine 25 4 times a day  Increase labetalol 100 mg twice daily  Discontinue Diovan because of renal failure  Patient will continue amlodipine 5 mg daily    Diabetic shoes were prescribed.  Patient was given  flu shot and Prevnar 23.  GI consult was also done for colon cancer screening colonoscopy  Healthy lifestyle and diet restrictions discussed.  Unfortunately patient cannot take statin therapy        No Follow-up on file.

## 2020-01-01 ENCOUNTER — HOSPITAL ENCOUNTER (OUTPATIENT)
Dept: ULTRASOUND IMAGING | Facility: HOSPITAL | Age: 55
Discharge: HOME | End: 2020-01-06
Admitting: INTERNAL MEDICINE

## 2020-01-01 ENCOUNTER — LAB (OUTPATIENT)
Dept: LAB | Facility: HOSPITAL | Age: 55
End: 2020-01-01

## 2020-01-01 ENCOUNTER — OFFICE VISIT (OUTPATIENT)
Dept: CARDIOLOGY | Facility: CLINIC | Age: 55
End: 2020-01-01

## 2020-01-01 ENCOUNTER — TELEPHONE (OUTPATIENT)
Dept: CARDIOLOGY | Facility: CLINIC | Age: 55
End: 2020-01-01

## 2020-01-01 ENCOUNTER — TRANSCRIBE ORDERS (OUTPATIENT)
Dept: ADMINISTRATIVE | Facility: HOSPITAL | Age: 55
End: 2020-01-01

## 2020-01-01 VITALS
WEIGHT: 178.2 LBS | HEIGHT: 68 IN | HEART RATE: 74 BPM | BODY MASS INDEX: 27.01 KG/M2 | SYSTOLIC BLOOD PRESSURE: 130 MMHG | DIASTOLIC BLOOD PRESSURE: 84 MMHG | OXYGEN SATURATION: 98 %

## 2020-01-01 DIAGNOSIS — E78.1 HYPERLIPIDEMIA TYPE IV: ICD-10-CM

## 2020-01-01 DIAGNOSIS — N18.30 STAGE 3 CHRONIC KIDNEY DISEASE (HCC): ICD-10-CM

## 2020-01-01 DIAGNOSIS — Z12.11 SCREENING FOR COLON CANCER: ICD-10-CM

## 2020-01-01 DIAGNOSIS — I10 ESSENTIAL HYPERTENSION: Primary | ICD-10-CM

## 2020-01-01 DIAGNOSIS — E66.9 CLASS 1 OBESITY WITHOUT SERIOUS COMORBIDITY WITH BODY MASS INDEX (BMI) OF 30.0 TO 30.9 IN ADULT, UNSPECIFIED OBESITY TYPE: ICD-10-CM

## 2020-01-01 DIAGNOSIS — G62.9 PERIPHERAL NEURITIS: ICD-10-CM

## 2020-01-01 DIAGNOSIS — N18.30 TYPE 2 DIABETES MELLITUS WITH STAGE 3 CHRONIC KIDNEY DISEASE, WITH LONG-TERM CURRENT USE OF INSULIN (HCC): ICD-10-CM

## 2020-01-01 DIAGNOSIS — E11.22 TYPE 2 DIABETES MELLITUS WITH STAGE 3 CHRONIC KIDNEY DISEASE, WITH LONG-TERM CURRENT USE OF INSULIN (HCC): ICD-10-CM

## 2020-01-01 DIAGNOSIS — R74.8 ELEVATED CPK: Primary | ICD-10-CM

## 2020-01-01 DIAGNOSIS — N18.30 CHRONIC KIDNEY DISEASE, STAGE III (MODERATE) (HCC): ICD-10-CM

## 2020-01-01 DIAGNOSIS — Z72.0 TOBACCO USE: ICD-10-CM

## 2020-01-01 DIAGNOSIS — R74.8 ELEVATED CPK: ICD-10-CM

## 2020-01-01 DIAGNOSIS — Z79.4 TYPE 2 DIABETES MELLITUS WITH STAGE 3 CHRONIC KIDNEY DISEASE, WITH LONG-TERM CURRENT USE OF INSULIN (HCC): ICD-10-CM

## 2020-01-01 DIAGNOSIS — N18.30 CHRONIC KIDNEY DISEASE, STAGE III (MODERATE) (HCC): Primary | ICD-10-CM

## 2020-01-01 LAB
25(OH)D3 SERPL-MCNC: 16.3 NG/ML (ref 30–100)
ALBUMIN SERPL-MCNC: 4.3 G/DL (ref 3.5–5.2)
ALBUMIN/GLOB SERPL: 1.4 G/DL
ALP SERPL-CCNC: 102 U/L (ref 39–117)
ALT SERPL W P-5'-P-CCNC: 23 U/L (ref 1–41)
ANA SER QL: NEGATIVE
ANION GAP SERPL CALCULATED.3IONS-SCNC: 14.3 MMOL/L (ref 5–15)
ANION GAP SERPL CALCULATED.3IONS-SCNC: 16.2 MMOL/L (ref 5–15)
AST SERPL-CCNC: 34 U/L (ref 1–40)
BASOPHILS # BLD AUTO: 0.05 10*3/MM3 (ref 0–0.2)
BASOPHILS NFR BLD AUTO: 0.6 % (ref 0–1.5)
BILIRUB SERPL-MCNC: 0.4 MG/DL (ref 0.2–1.2)
BILIRUB UR QL STRIP: NEGATIVE
BUN BLD-MCNC: 33 MG/DL (ref 6–20)
BUN BLD-MCNC: 57 MG/DL (ref 6–20)
BUN/CREAT SERPL: 22.1 (ref 7–25)
BUN/CREAT SERPL: 27.9 (ref 7–25)
CALCIUM SPEC-SCNC: 9.1 MG/DL (ref 8.6–10.5)
CALCIUM SPEC-SCNC: 9.2 MG/DL (ref 8.6–10.5)
CCP IGA+IGG SERPL IA-ACNC: 8 UNITS (ref 0–19)
CHLORIDE SERPL-SCNC: 101 MMOL/L (ref 98–107)
CHLORIDE SERPL-SCNC: 104 MMOL/L (ref 98–107)
CK SERPL-CCNC: 1191 U/L (ref 20–200)
CLARITY UR: CLEAR
CO2 SERPL-SCNC: 21.8 MMOL/L (ref 22–29)
CO2 SERPL-SCNC: 23.7 MMOL/L (ref 22–29)
COLOR UR: YELLOW
CREAT BLD-MCNC: 1.49 MG/DL (ref 0.76–1.27)
CREAT BLD-MCNC: 2.04 MG/DL (ref 0.76–1.27)
CREAT UR-MCNC: 55.1 MG/DL
DEPRECATED RDW RBC AUTO: 43.9 FL (ref 37–54)
EOSINOPHIL # BLD AUTO: 0.23 10*3/MM3 (ref 0–0.4)
EOSINOPHIL NFR BLD AUTO: 2.7 % (ref 0.3–6.2)
ERYTHROCYTE [DISTWIDTH] IN BLOOD BY AUTOMATED COUNT: 12.9 % (ref 12.3–15.4)
FERRITIN SERPL-MCNC: 109 NG/ML (ref 30–400)
GFR SERPL CREATININE-BSD FRML MDRD: 34 ML/MIN/1.73
GFR SERPL CREATININE-BSD FRML MDRD: 49 ML/MIN/1.73
GLOBULIN UR ELPH-MCNC: 3.1 GM/DL
GLUCOSE BLD-MCNC: 117 MG/DL (ref 65–99)
GLUCOSE BLD-MCNC: 125 MG/DL (ref 65–99)
GLUCOSE UR STRIP-MCNC: NEGATIVE MG/DL
HCT VFR BLD AUTO: 40.2 % (ref 37.5–51)
HGB BLD-MCNC: 13.8 G/DL (ref 13–17.7)
HGB UR QL STRIP.AUTO: ABNORMAL
IMM GRANULOCYTES # BLD AUTO: 0.03 10*3/MM3 (ref 0–0.05)
IMM GRANULOCYTES NFR BLD AUTO: 0.3 % (ref 0–0.5)
KETONES UR QL STRIP: NEGATIVE
LEUKOCYTE ESTERASE UR QL STRIP.AUTO: NEGATIVE
LYMPHOCYTES # BLD AUTO: 2.1 10*3/MM3 (ref 0.7–3.1)
LYMPHOCYTES NFR BLD AUTO: 24.4 % (ref 19.6–45.3)
MCH RBC QN AUTO: 32.3 PG (ref 26.6–33)
MCHC RBC AUTO-ENTMCNC: 34.3 G/DL (ref 31.5–35.7)
MCV RBC AUTO: 94.1 FL (ref 79–97)
MONOCYTES # BLD AUTO: 0.85 10*3/MM3 (ref 0.1–0.9)
MONOCYTES NFR BLD AUTO: 9.9 % (ref 5–12)
NEUTROPHILS # BLD AUTO: 5.36 10*3/MM3 (ref 1.7–7)
NEUTROPHILS NFR BLD AUTO: 62.1 % (ref 42.7–76)
NITRITE UR QL STRIP: NEGATIVE
NRBC BLD AUTO-RTO: 0 /100 WBC (ref 0–0.2)
PH UR STRIP.AUTO: 6.5 [PH] (ref 5–8)
PHOSPHATE SERPL-MCNC: 3.8 MG/DL (ref 2.5–4.5)
PLATELET # BLD AUTO: 291 10*3/MM3 (ref 140–450)
PMV BLD AUTO: 11.7 FL (ref 6–12)
POTASSIUM BLD-SCNC: 4.2 MMOL/L (ref 3.5–5.2)
POTASSIUM BLD-SCNC: 4.6 MMOL/L (ref 3.5–5.2)
PROT SERPL-MCNC: 7.4 G/DL (ref 6–8.5)
PROT UR QL STRIP: ABNORMAL
PROT UR-MCNC: 352 MG/DL
PROT/CREAT UR: 6388.4 MG/G CREA (ref 0–200)
PTH-INTACT SERPL-MCNC: 30.9 PG/ML (ref 15–65)
RBC # BLD AUTO: 4.27 10*6/MM3 (ref 4.14–5.8)
SODIUM BLD-SCNC: 139 MMOL/L (ref 136–145)
SODIUM BLD-SCNC: 142 MMOL/L (ref 136–145)
SP GR UR STRIP: 1.02 (ref 1–1.03)
UROBILINOGEN UR QL STRIP: ABNORMAL
WBC NRBC COR # BLD: 8.62 10*3/MM3 (ref 3.4–10.8)

## 2020-01-01 PROCEDURE — 83970 ASSAY OF PARATHORMONE: CPT

## 2020-01-01 PROCEDURE — 84156 ASSAY OF PROTEIN URINE: CPT

## 2020-01-01 PROCEDURE — 82550 ASSAY OF CK (CPK): CPT

## 2020-01-01 PROCEDURE — 86038 ANTINUCLEAR ANTIBODIES: CPT

## 2020-01-01 PROCEDURE — 81003 URINALYSIS AUTO W/O SCOPE: CPT

## 2020-01-01 PROCEDURE — 36415 COLL VENOUS BLD VENIPUNCTURE: CPT

## 2020-01-01 PROCEDURE — 80053 COMPREHEN METABOLIC PANEL: CPT

## 2020-01-01 PROCEDURE — 76775 US EXAM ABDO BACK WALL LIM: CPT

## 2020-01-01 PROCEDURE — 80048 BASIC METABOLIC PNL TOTAL CA: CPT

## 2020-01-01 PROCEDURE — 86200 CCP ANTIBODY: CPT

## 2020-01-01 PROCEDURE — 82728 ASSAY OF FERRITIN: CPT

## 2020-01-01 PROCEDURE — 82306 VITAMIN D 25 HYDROXY: CPT

## 2020-01-01 PROCEDURE — 85025 COMPLETE CBC W/AUTO DIFF WBC: CPT

## 2020-01-01 PROCEDURE — 82570 ASSAY OF URINE CREATININE: CPT

## 2020-01-01 PROCEDURE — 99214 OFFICE O/P EST MOD 30 MIN: CPT | Performed by: INTERNAL MEDICINE

## 2020-01-01 PROCEDURE — 84100 ASSAY OF PHOSPHORUS: CPT

## 2020-01-01 PROCEDURE — 76775 US EXAM ABDO BACK WALL LIM: CPT | Performed by: RADIOLOGY

## 2020-01-01 RX ORDER — GABAPENTIN 800 MG/1
800 TABLET ORAL 3 TIMES DAILY
COMMUNITY
Start: 2020-01-01

## 2020-01-01 RX ORDER — INSULIN LISPRO 100 U/ML
INJECTION, SOLUTION SUBCUTANEOUS
COMMUNITY
Start: 2019-01-01 | End: 2020-01-01 | Stop reason: ALTCHOICE

## 2020-01-14 NOTE — PROGRESS NOTES
subjective     Chief Complaint   Patient presents with   • Results     labs   • Hypertension   • Diabetes     History of Present Illness    Patient is 54 years old white male who is here for follow-up.  Patient has multiple chronic medical problems.  He recently had lab work done which will be reviewed and discussed.  Blood pressure apparently is very well controlled on current medications.  He is taking labetalol, amlodipine and hydralazine.  Patient also has hyperlipidemia and is mildly overweight.  He is diabetic blood sugar is running high in the morning and in the evening.  Patient checks blood sugar 3 times a day and he brought his notes.  Blood sugar in the morning is around 140 noontime around 90 and at bedtime is around 160.  Past Surgical History:   Procedure Laterality Date   • CARDIOVASCULAR STRESS TEST  04/2015   • ECHO - CONVERTED  10/2014   • TEETH EXTRACTION       Family History   Problem Relation Age of Onset   • Cancer Sister    • Diabetes Sister      Past Medical History:   Diagnosis Date   • Diabetes mellitus (CMS/HCC)    • Hyperlipidemia    • Hypertension    • Leg cramps    • Obesity    • Peripheral neuritis    • Tobacco use      Patient Active Problem List   Diagnosis   • Leg cramps*   • Peripheral neuritis*   • Obesity*   • Tobacco use*   • Hyperlipidemia type IV*   • Hypertension*   • Type 2 diabetes mellitus with chronic kidney disease, with long-term current use of insulin (CMS/HCC)   • Chronic fatigue   • Elevated CPK   • Chronic kidney disease   • Mass, right forehead   • Chronic cough       Social History     Tobacco Use   • Smoking status: Current Every Day Smoker     Packs/day: 1.50     Years: 35.00     Pack years: 52.50     Types: Cigarettes   • Smokeless tobacco: Never Used   Substance Use Topics   • Alcohol use: No   • Drug use: No       No Known Allergies    Current Outpatient Medications on File Prior to Visit   Medication Sig   • acetaminophen-codeine (TYLENOL #3) 300-30 MG per  tablet Take 1 tablet by mouth Every 4 (Four) Hours As Needed for Moderate Pain .   • amLODIPine (NORVASC) 5 MG tablet TAKE 1 TABLET BY MOUTH DAILY   • baclofen (LIORESAL) 20 MG tablet Take 20 mg by mouth 3 (Three) Times a Day.   • BD PEN NEEDLE NOLBERTO U/F 32G X 4 MM misc USE TO INJECT FOUR TIMES DAILY   • Blood Glucose Monitoring Suppl (ONE TOUCH ULTRA 2) W/DEVICE kit 1 kit 3 (Three) Times a Day. E11.9   • gabapentin (NEURONTIN) 800 MG tablet Take 800 mg by mouth 3 (Three) Times a Day.   • hydrALAZINE (APRESOLINE) 25 MG tablet Take 1 tablet by mouth 4 (Four) Times a Day.   • insulin aspart (novoLOG FLEXPEN) 100 UNIT/ML solution pen-injector sc pen Inject 10 Units under the skin into the appropriate area as directed 3 (Three) Times a Day With Meals.   • Insulin Glargine (BASAGLAR KWIKPEN) 100 UNIT/ML injection pen INJECT 45 UNITS UNDER THE SKIN EVERY DAY   • labetalol (NORMODYNE) 100 MG tablet Take 1 tablet by mouth 2 (Two) Times a Day.   • Lancets (ONETOUCH ULTRASOFT) lancets Pt needs to check blood sugar 3 times a day E11.9   • ONE TOUCH ULTRA TEST test strip USE TO TEST BLOOD SUGAR THREE TIMES DAILY   • venlafaxine (EFFEXOR) 75 MG tablet Take 75 mg by mouth 2 (Two) Times a Day.     No current facility-administered medications on file prior to visit.          The following portions of the patient's history were reviewed and updated as appropriate: allergies, current medications, past family history, past medical history, past social history, past surgical history and problem list.    Review of Systems   Constitution: Negative.   HENT: Negative.  Negative for congestion.    Eyes: Negative.    Cardiovascular: Negative.  Negative for chest pain, cyanosis, dyspnea on exertion, irregular heartbeat, leg swelling, near-syncope, orthopnea, palpitations, paroxysmal nocturnal dyspnea and syncope.   Respiratory: Negative.  Negative for shortness of breath.    Hematologic/Lymphatic: Negative.    Musculoskeletal: Negative.     "  Gastrointestinal: Negative.    Neurological: Positive for numbness and paresthesias. Negative for headaches.          Objective:     /84 (BP Location: Left arm, Patient Position: Sitting)   Pulse 74   Ht 172.7 cm (68\")   Wt 80.8 kg (178 lb 3.2 oz)   SpO2 98%   BMI 27.10 kg/m²   Physical Exam   Constitutional: He appears well-developed and well-nourished. No distress.   HENT:   Head: Normocephalic and atraumatic.   Mouth/Throat: Oropharynx is clear and moist. No oropharyngeal exudate.   Eyes: Pupils are equal, round, and reactive to light. Conjunctivae and EOM are normal. No scleral icterus.   Neck: Normal range of motion. Neck supple. No JVD present. No tracheal deviation present. No thyromegaly present.   Cardiovascular: Normal rate, regular rhythm, normal heart sounds and intact distal pulses. PMI is not displaced. Exam reveals no gallop, no friction rub and no decreased pulses.   No murmur heard.  Pulses:       Carotid pulses are 3+ on the right side, and 3+ on the left side.       Radial pulses are 3+ on the right side, and 3+ on the left side.   Pulmonary/Chest: Effort normal and breath sounds normal. No respiratory distress. He has no wheezes. He has no rales. He exhibits no tenderness.   Abdominal: Soft. Bowel sounds are normal. He exhibits no distension, no abdominal bruit and no mass. There is no splenomegaly or hepatomegaly. There is no tenderness. There is no rebound and no guarding.   Musculoskeletal: Normal range of motion. He exhibits no edema, tenderness or deformity.   Lymphadenopathy:     He has no cervical adenopathy.   Neurological: He is alert. He has normal reflexes. No cranial nerve deficit. He exhibits normal muscle tone. Coordination normal.   Skin: Skin is warm and dry. No rash noted. He is not diaphoretic. No erythema.   Psychiatric: He has a normal mood and affect. His behavior is normal. Judgment and thought content normal.         Lab Review  Lab Results   Component Value " Date     01/11/2020    K 4.6 01/11/2020     01/11/2020    BUN 57 (H) 01/11/2020    CREATININE 2.04 (H) 01/11/2020    GLUCOSE 125 (H) 01/11/2020    CALCIUM 9.1 01/11/2020    ALT 23 01/11/2020    ALKPHOS 102 01/11/2020    LABIL2 1.5 01/29/2016     Lab Results   Component Value Date    CKTOTAL 1,191 (H) 01/11/2020     Lab Results   Component Value Date    WBC 8.62 01/06/2020    HGB 13.8 01/06/2020    HCT 40.2 01/06/2020     01/06/2020     No results found for: INR    Procedures       I personally viewed and interpreted the patient's LAB data         Assessment:     1. Essential hypertension    2. Hyperlipidemia type IV*    3. Type 2 diabetes mellitus with stage 3 chronic kidney disease, with long-term current use of insulin (CMS/[facility])    4. Tobacco use*    5. Peripheral neuritis*    6. Class 1 obesity without serious comorbidity with body mass index (BMI) of 30.0 to 30.9 in adult, unspecified obesity type          Plan:     Lab work reviewed  Insulin dose was adjusted.    Blood pressure is well controlled no change in therapy needed.    CPK is significantly elevated, it is 1191.  He is not taking any statin therapy and has not taken any statin therapy for years    CHARLEEN is negative, will get rheumatology consult.    Renal functions are also getting worse.  Patient was advised to drink more fluids.  He is not taking any arthritis medications are diuretic therapy.  We will get nephrology consult.    No follow-ups on file.

## 2020-01-14 NOTE — TELEPHONE ENCOUNTER
----- Message from Fredrick Saldivar MD sent at 1/13/2020  9:26 AM EST -----  CPK is elevated.  Rheumatology consult with Dr. Munoz

## 2022-11-18 NOTE — TELEPHONE ENCOUNTER
Request  = Approved sent into [facility]   pt has been provided with urinal & aware of need for sample.  pt has not been able to void at this time.